# Patient Record
Sex: MALE | Race: WHITE | Employment: FULL TIME | URBAN - METROPOLITAN AREA
[De-identification: names, ages, dates, MRNs, and addresses within clinical notes are randomized per-mention and may not be internally consistent; named-entity substitution may affect disease eponyms.]

---

## 2018-08-03 ENCOUNTER — OFFICE VISIT (OUTPATIENT)
Dept: INTERNAL MEDICINE CLINIC | Age: 58
End: 2018-08-03

## 2018-08-03 VITALS
OXYGEN SATURATION: 97 % | BODY MASS INDEX: 33.37 KG/M2 | HEART RATE: 92 BPM | WEIGHT: 251.8 LBS | DIASTOLIC BLOOD PRESSURE: 70 MMHG | HEIGHT: 73 IN | SYSTOLIC BLOOD PRESSURE: 130 MMHG

## 2018-08-03 DIAGNOSIS — R51.9 ACUTE NONINTRACTABLE HEADACHE, UNSPECIFIED HEADACHE TYPE: Primary | ICD-10-CM

## 2018-08-03 RX ORDER — MELATONIN
DAILY
COMMUNITY

## 2018-08-03 RX ORDER — DIFLUNISAL 500 MG/1
500 TABLET, FILM COATED ORAL 2 TIMES DAILY
Qty: 60 TAB | Refills: 0 | Status: SHIPPED | OUTPATIENT
Start: 2018-08-03 | End: 2019-01-31 | Stop reason: ALTCHOICE

## 2018-08-03 RX ORDER — BISMUTH SUBSALICYLATE 262 MG
1 TABLET,CHEWABLE ORAL DAILY
COMMUNITY

## 2018-08-03 RX ORDER — CYCLOBENZAPRINE HCL 10 MG
10 TABLET ORAL
Qty: 30 TAB | Refills: 0 | Status: SHIPPED | OUTPATIENT
Start: 2018-08-03 | End: 2019-01-31 | Stop reason: ALTCHOICE

## 2018-08-03 NOTE — PATIENT INSTRUCTIONS
Head or Face Pain: Care Instructions Your Care Instructions Common causes of head or face pain are allergies, stress, and injuries. Other causes include tooth problems and sinus infections. Eating certain foods, such as chocolate or cheese, or drinking certain liquids, such as coffee or cola, can cause head pain for some people. If you have mild head pain, you may not need treatment. It is important to watch your symptoms and talk to your doctor if your pain continues or gets worse. Follow-up care is a key part of your treatment and safety. Be sure to make and go to all appointments, and call your doctor if you are having problems. It's also a good idea to know your test results and keep a list of the medicines you take. How can you care for yourself at home? · Take pain medicines exactly as directed. ¨ If the doctor gave you a prescription medicine for pain, take it as prescribed. ¨ If you are not taking a prescription pain medicine, ask your doctor if you can take an over-the-counter pain medicine. · Take it easy for the next few days or longer if you are not feeling well. · Use a warm, moist towel or heating pad set on low to relax tight muscles in your shoulder and neck. Have someone gently massage your neck and shoulders. · Put ice or a cold pack on the area for 10 to 20 minutes at a time. Put a thin cloth between the ice and your skin. When should you call for help? Call 911 anytime you think you may need emergency care. For example, call if: 
  · You have twitching, jerking, or a seizure.  
  · You passed out (lost consciousness).  
  · You have symptoms of a stroke. These may include: 
¨ Sudden numbness, tingling, weakness, or loss of movement in your face, arm, or leg, especially on only one side of your body. ¨ Sudden vision changes. ¨ Sudden trouble speaking. ¨ Sudden confusion or trouble understanding simple statements. ¨ Sudden problems with walking or balance.  
¨ A sudden, severe headache that is different from past headaches.  
  · You have jaw pain and pain in your chest, shoulder, neck, or arm.  
 Call your doctor now or seek immediate medical care if: 
  · You have a fever with a stiff neck or a severe headache.  
  · You have nausea and vomiting, or you cannot keep food or liquids down.  
 Watch closely for changes in your health, and be sure to contact your doctor if: 
  · Your head or face pain does not get better as expected. Where can you learn more? Go to http://earline-adamaris.info/. Enter P568 in the search box to learn more about \"Head or Face Pain: Care Instructions. \" Current as of: November 20, 2017 Content Version: 11.7 © 1162-0989 [employer]. Care instructions adapted under license by [employer] (which disclaims liability or warranty for this information). If you have questions about a medical condition or this instruction, always ask your healthcare professional. [address] any warranty or liability for your use of this information.

## 2018-08-03 NOTE — MR AVS SNAPSHOT
[address] [address] [address] [id number] [phone] Patient: Nani Mccormack MRN: [id number] EFO:[id number] Visit Information Date & Time Provider Department Dept. Phone Encounter #  
 8/3/2018  8:50 AM Nish Tate MD [facility] [id number] Follow-up Instructions Return if symptoms worsen or fail to improve. Upcoming Health Maintenance Date Due DTaP/Tdap/Td series (1 - Tdap) 3/27/1979 FOBT Q 1 YEAR AGE 50-75 3/27/2008 ZOSTER VACCINE AGE 60> 1/27/2018 Influenza Age 5 to Adult 8/1/2018 Allergies as of 8/3/2018  Review Complete On: 8/3/2018 By: Nish Tate MD  
  
 Severity Noted Reaction Type Reactions Ciprofloxacin  08/03/2018    Other (comments) Eye drops Current Immunizations  Never Reviewed No immunizations on file. Not reviewed this visit Vitals BP Pulse Height(growth percentile) Weight(growth percentile) SpO2 BMI  
 130/70 (BP 1 Location: Right arm, BP Patient Position: Sitting) 92 6' 1\" (1.854 m) 251 lb 12.8 oz (114.2 kg) 97% 33.22 kg/m2 Smoking Status Never Smoker BMI and BSA Data Body Mass Index Body Surface Area  
 33.22 kg/m 2 2.43 m 2 Preferred Pharmacy Pharmacy Name Phone [id number], [address] [place] [address] Your Updated Medication List  
  
   
This list is accurate as of 8/3/18  9:11 AM.  Always use your most recent med list.  
  
  
  
  
 cyclobenzaprine 10 mg tablet Commonly known as:  FLEXERIL Take 1 Tab by mouth three (3) times daily as needed for Muscle Spasm(s). diflunisal 500 mg Tab Commonly known as:  DOLOBID Take 1 Tab by mouth two (2) times a day. FISH  mg Cap Generic drug:  Omega-3 Fatty Acids Take  by mouth.  
  
 multivitamin tablet Commonly known as:  ONE A DAY Take 1 Tab by mouth daily. VITAMIN D3 1,000 unit tablet Generic drug:  cholecalciferol Take  by mouth daily. Prescriptions Sent to Pharmacy Refills  
 diflunisal (DOLOBID) 500 mg tab 0 Sig: Take 1 Tab by mouth two (2) times a day. Class: Normal  
 Pharmacy: [facility], [address] [place] [address] Ph #: [phone] Route: Oral  
 cyclobenzaprine (FLEXERIL) 10 mg tablet 0 Sig: Take 1 Tab by mouth three (3) times daily as needed for Muscle Spasm(s). Class: Normal  
 Pharmacy: [facility], [address] [place] [address] Ph #: [phone] Route: Oral  
  
Follow-up Instructions Return if symptoms worsen or fail to improve. Introducing [facility]! Jamee Reagan introduces [employer] patient portal. Now you can access parts of your medical record, email your doctor's office, and request medication refills online. 1. In your internet browser, go to [email or web address] 2. Click on the First Time User? Click Here link in the Sign In box. You will see the New Member Sign Up page. 3. Enter your [employer] Access Code exactly as it appears below. You will not need to use this code after youve completed the sign-up process. If you do not sign up before the expiration date, you must request a new code. · [employer] Access Code: [email or web address] Expires: 11/1/2018  8:36 AM 
 
4. Enter the last four digits of your Social Security Number (xxxx) and Date of Birth (mm/dd/yyyy) as indicated and click Submit. You will be taken to the next sign-up page. 5. Create a [employer] ID. This will be your [employer] login ID and cannot be changed, so think of one that is secure and easy to remember. 6. Create a [employer] password. You can change your password at any time. 7. Enter your Password Reset Question and Answer.  This can be used at a later time if you forget your password. 8. Enter your e-mail address. You will receive e-mail notification when new information is available in [address] 9. Click Sign Up. You can now view and download portions of your medical record. 10. Click the Download Summary menu link to download a portable copy of your medical information. If you have questions, please visit the Frequently Asked Questions section of the [employer] website. Remember, [employer] is NOT to be used for urgent needs. For medical emergencies, dial 911. Now available from your iPhone and Android! Please provide this summary of care documentation to your next provider. Your primary care clinician is listed as MARIA GUADALUPE Julio 21. If you have any questions after today's visit, please call [phone].

## 2018-08-03 NOTE — PROGRESS NOTES
Peter Elkins is a 59 y.o. male presenting for Head Pain Blaise Downey 1. Have you been to the ER, urgent care clinic since your last visit? Hospitalized since your last visit? No 
 
2. Have you seen or consulted any other health care providers outside of the [facility] since your last visit? Include any pap smears or colon screening. No 
 
No flowsheet data found. No flowsheet data found. No flowsheet data found. There are no discontinued medications.

## 2018-08-03 NOTE — PROGRESS NOTES
This note will not be viewable in [address] Subjective:  
 
Mr. Marques Laughlin presents to the office today with complaints of 3 days worth of pain in the right parietal occipital region in his right ear and occasionally in his neck. The pain is not constant but intermittent and sometimes a shooting type of pain. He notes that his head feels sensitive. There is been no rash. He has taken an occasional ibuprofen without relief. He has had no loss of hearing. The patient denies any jaw pain or neck pain. There is been no radicular discomfort. He also notes that he has a synovial cyst in his lumbar region for which she has had previous injections. He is called his specialist to go in for another injection since it is been a year since he had the last one. The specialist will not treat him with any type of medication for his cyst until he is seen. He was told to go see his primary care doctor for medication. Past Medical History:  
Diagnosis Date  Acute headache 8/3/2018  Synovial cyst of lumbar spine Past Surgical History:  
Procedure Laterality Date  HX ORTHOPAEDIC Allergies Allergen Reactions  Ciprofloxacin Other (comments) Eye drops Current Outpatient Prescriptions Medication Sig Dispense Refill  multivitamin (ONE A DAY) tablet Take 1 Tab by mouth daily.  cholecalciferol (VITAMIN D3) 1,000 unit tablet Take  by mouth daily.  Omega-3 Fatty Acids (FISH OIL) 500 mg cap Take  by mouth.  diflunisal (DOLOBID) 500 mg tab Take 1 Tab by mouth two (2) times a day. 60 Tab 0  cyclobenzaprine (FLEXERIL) 10 mg tablet Take 1 Tab by mouth three (3) times daily as needed for Muscle Spasm(s). 30 Tab 0 Social History Social History  Marital status:  Spouse name: N/A  
 Number of children: N/A  
 Years of education: N/A Social History Main Topics  Smoking status: Never Smoker  Smokeless tobacco: Never Used  Alcohol use None  Drug use: None  Sexual activity: Not Asked Other Topics Concern  None Social History Narrative  None No family history on file. Review of Systems: 
GEN: no weight loss, weight gain, fatigue or night sweats. Complains of right scalp sensitivity and intermittent pains in the right hemicranium CV: no PND, orthopnea, or palpitations Resp: no dyspnea on exertion, no cough Abd: no nausea, vomiting or diarrhea EXT: denies edema, claudication Endocrine: no hair loss, excessive thirst or polyuria Neurological ROS: no TIA or stroke symptoms ROS otherwise negative Objective:  
 
Visit Vitals  /70 (BP 1 Location: Right arm, BP Patient Position: Sitting)  Pulse 92  
 Ht 6' 1\" (1.854 m)  Wt 251 lb 12.8 oz (114.2 kg)  SpO2 97%  BMI 33.22 kg/m2 Body mass index is 33.22 kg/(m^2). General:   alert, cooperative and no distress ENT  the right ear canal and TM were completely normal to visualization Eyes: conjunctivae/sclerae clear. PERRL, EOM's intact Mouth:  No oral lesions, no pharyngeal erythema, no exudates. Patient has no pain in the temporomandibular joint regions with palpation or with range of motion Neck: Trachea midline, no thyromegaly, no bruits. There was no pain with palpation along the occipital ridge. Derm:  No rashes seen in the neck, on the face or in the scalp. Neuro: ..alert, oriented x3,speech normal in context and clarity, cranial nerves II-XII intact,motor strength: full proximally and distally,gait: normal 
reflexes: full and symmetric Physical exam otherwise negative Assessment/Plan:  
 
Diagnoses and all orders for this visit: 
 
Acute nonintractable headache, unspecified headache type Other orders 
-     diflunisal (DOLOBID) 500 mg tab; Take 1 Tab by mouth two (2) times a day., Normal, Disp-60 Tab, R-0 
-     cyclobenzaprine (FLEXERIL) 10 mg tablet;  Take 1 Tab by mouth three (3) times daily as needed for Muscle Spasm(s). , Normal, Disp-30 Tab, R-0 Other instructions: The patient presents with scalp sensitivity and intermittent pain in the right hemicranium. There is no localized findings to explain his symptoms. There is no rash to suggest shingles. He has had no focal neurological deficits to suggest any type of intracranial process. Have placed on Dolobid and Flexeril to see if this will alleviate his symptoms. He is to watch for the development of rash or any new neurological symptoms and to contact me immediately if such should develop. He will follow-up in 7-10 days time if not improve Follow-up Disposition: 
Return if symptoms worsen or fail to improve.  
 
Yusuf Matos MD

## 2018-08-27 PROBLEM — H91.91 HEARING LOSS, RIGHT: Status: ACTIVE | Noted: 2018-08-27

## 2018-08-27 PROBLEM — N52.9 ED (ERECTILE DYSFUNCTION): Status: ACTIVE | Noted: 2018-08-27

## 2018-08-27 PROBLEM — K63.5 COLON POLYPS: Status: ACTIVE | Noted: 2018-08-27

## 2018-08-27 PROBLEM — E55.9 VITAMIN D DEFICIENCY: Status: ACTIVE | Noted: 2018-08-27

## 2019-01-31 ENCOUNTER — OFFICE VISIT (OUTPATIENT)
Dept: INTERNAL MEDICINE CLINIC | Age: 59
End: 2019-01-31

## 2019-01-31 VITALS
DIASTOLIC BLOOD PRESSURE: 84 MMHG | HEIGHT: 73 IN | TEMPERATURE: 98.1 F | OXYGEN SATURATION: 98 % | RESPIRATION RATE: 20 BRPM | SYSTOLIC BLOOD PRESSURE: 124 MMHG | WEIGHT: 250 LBS | HEART RATE: 68 BPM | BODY MASS INDEX: 33.13 KG/M2

## 2019-01-31 DIAGNOSIS — L72.3 SEBACEOUS CYST: Primary | ICD-10-CM

## 2019-01-31 RX ORDER — CEPHALEXIN 500 MG/1
500 CAPSULE ORAL 4 TIMES DAILY
Qty: 20 CAP | Refills: 0 | Status: SHIPPED | OUTPATIENT
Start: 2019-01-31 | End: 2021-02-01 | Stop reason: ALTCHOICE

## 2019-01-31 NOTE — PROGRESS NOTES
This note will not be viewable in [address] Subjective:  
 
Mr. Skyla Bowman presents to the office today with complaints of an abscess on his back which has drained. He has noted a small swollen area in this vicinity for 2-3 years. He was doing Pilates and noticed that when he was laying on his back that this area had gotten bigger. Yesterday it drained a fair amount of pus. The patient notes no pain at the present time. He has had no fevers or chills. Past Medical History:  
Diagnosis Date  Acute headache 8/3/2018  Colon polyps 8/27/2018  ED (erectile dysfunction) 8/27/2018  Hearing loss, right 8/27/2018  Synovial cyst of lumbar spine  Vitamin D deficiency 8/27/2018 Past Surgical History:  
Procedure Laterality Date  HX ORTHOPAEDIC Allergies Allergen Reactions  Ciprofloxacin Other (comments) Eye drops Current Outpatient Medications Medication Sig Dispense Refill  amino acids/mv,iron,min (OCUVITE EXTRA PO) Take  by mouth.  cephALEXin (KEFLEX) 500 mg capsule Take 1 Cap by mouth four (4) times daily. 20 Cap 0  
 multivitamin (ONE A DAY) tablet Take 1 Tab by mouth daily.  cholecalciferol (VITAMIN D3) 1,000 unit tablet Take  by mouth daily.  Omega-3 Fatty Acids (FISH OIL) 500 mg cap Take  by mouth. Social History Socioeconomic History  Marital status:  Spouse name: Not on file  Number of children: Not on file  Years of education: Not on file  Highest education level: Not on file Tobacco Use  Smoking status: Never Smoker  Smokeless tobacco: Never Used Substance and Sexual Activity  Alcohol use: Yes Comment: occasionally  Drug use: No  
 
Family History Problem Relation Age of Onset  Cancer Father   
     breast cancer  Emphysema Father Review of Systems: 
GEN: no weight loss, weight gain, fatigue or night sweats CV: no PND, orthopnea, or palpitations Resp: no dyspnea on exertion, no cough Abd: no nausea, vomiting or diarrhea EXT: denies edema, claudication Derm: Complains of abscess on back which has drained Neurological ROS: no TIA or stroke symptoms ROS otherwise negative Objective:  
 
Visit Vitals /84 (BP 1 Location: Right arm, BP Patient Position: Sitting) Pulse 68 Temp 98.1 °F (36.7 °C) (Oral) Resp 20 Ht 6' 1\" (1.854 m) Wt 250 lb (113.4 kg) SpO2 98% BMI 32.98 kg/m² Body mass index is 32.98 kg/m². General:   alert, cooperative and no distress Derm:  There are multiple seborrheic keratoses that are on the back. In the mid back region he has a sebaceous cyst which has spontaneously drained. There is some mild surrounding redness. No sebum could be expressed from the cyst.  
Neuro: ..alert, oriented x3,speech normal in context and clarity, cranial nerves II-XII intact,motor strength: full proximally and distally,gait: normal 
reflexes: full and symmetric Physical exam otherwise negative Assessment/Plan:  
 
Diagnoses and all orders for this visit: 
 
Sebaceous cyst 
-     cephALEXin (KEFLEX) 500 mg capsule; Take 1 Cap by mouth four (4) times daily. , Normal, Disp-20 Cap, R-0 Other instructions: The patient has a sebaceous cyst which has spontaneously drained. We have covered the wound as it currently is not draining and will start on Keflex for 5 days to cover for any infection. I have recommended an appointment with a general surgeon to have this excised as it most likely will recur in the future due to its position in the mid back region. Follow-up if there is any worsening Follow-up Disposition: 
Return if symptoms worsen or fail to improve.  
 
Markus Pedersen MD

## 2019-01-31 NOTE — PATIENT INSTRUCTIONS

## 2019-01-31 NOTE — PROGRESS NOTES
Kate Hodgson is a 59 y.o. male presenting for Cyst (on back/ draining) Naoma Jackccoli 1. Have you been to the ER, urgent care clinic since your last visit? Hospitalized since your last visit? Yes When: 12/2018 Where: [facility] clinic Reason for visit: sinus 2. Have you seen or consulted any other health care providers outside of the [address] [place] [address] since your last visit? Include any pap smears or colon screening. No 
 
No flowsheet data found. Abuse Screening Questionnaire 1/31/2019 Do you ever feel afraid of your partner? Tomi Jay Are you in a relationship with someone who physically or mentally threatens you? Tomi Jay Is it safe for you to go home? Y  
 
 
PHQ over the last two weeks 1/31/2019 Little interest or pleasure in doing things Not at all Feeling down, depressed, irritable, or hopeless Not at all Total Score PHQ 2 0 There are no discontinued medications.

## 2021-01-04 ENCOUNTER — TELEPHONE (OUTPATIENT)
Dept: INTERNAL MEDICINE CLINIC | Age: 61
End: 2021-01-04

## 2021-01-04 NOTE — TELEPHONE ENCOUNTER
Discussed COVID symptoms with patient- he was exposed on 12-18-20 and had symptoms afterward. He was not tested. Temp is now 99.5 and he has fatigue. Scheduled him a physical for 2-1-21.  He would like a COVID antibody test.

## 2021-02-01 ENCOUNTER — OFFICE VISIT (OUTPATIENT)
Dept: INTERNAL MEDICINE CLINIC | Age: 61
End: 2021-02-01
Payer: COMMERCIAL

## 2021-02-01 ENCOUNTER — TELEPHONE (OUTPATIENT)
Dept: INTERNAL MEDICINE CLINIC | Age: 61
End: 2021-02-01

## 2021-02-01 VITALS
SYSTOLIC BLOOD PRESSURE: 124 MMHG | WEIGHT: 209.6 LBS | HEART RATE: 83 BPM | RESPIRATION RATE: 16 BRPM | HEIGHT: 73 IN | BODY MASS INDEX: 27.78 KG/M2 | TEMPERATURE: 98 F | DIASTOLIC BLOOD PRESSURE: 70 MMHG | OXYGEN SATURATION: 95 %

## 2021-02-01 DIAGNOSIS — E55.9 VITAMIN D DEFICIENCY: ICD-10-CM

## 2021-02-01 DIAGNOSIS — Z11.59 NEED FOR HEPATITIS C SCREENING TEST: ICD-10-CM

## 2021-02-01 DIAGNOSIS — Z00.00 ROUTINE PHYSICAL EXAMINATION: Primary | ICD-10-CM

## 2021-02-01 DIAGNOSIS — N52.9 ERECTILE DYSFUNCTION, UNSPECIFIED ERECTILE DYSFUNCTION TYPE: ICD-10-CM

## 2021-02-01 DIAGNOSIS — Z11.3 SCREENING FOR STDS (SEXUALLY TRANSMITTED DISEASES): Primary | ICD-10-CM

## 2021-02-01 DIAGNOSIS — K63.5 POLYP OF COLON, UNSPECIFIED PART OF COLON, UNSPECIFIED TYPE: ICD-10-CM

## 2021-02-01 LAB
25(OH)D3 SERPL-MCNC: 38 NG/ML (ref 30–96)
A-G RATIO,AGRAT: 1.6 RATIO
ALBUMIN SERPL-MCNC: 4.9 G/DL (ref 3.9–5.4)
ALP SERPL-CCNC: 84 U/L (ref 38–126)
ALT SERPL-CCNC: 30 U/L (ref 0–50)
ANION GAP SERPL CALC-SCNC: 13 MMOL/L
AST SERPL W P-5'-P-CCNC: 27 U/L (ref 14–36)
BILIRUB SERPL-MCNC: 0.7 MG/DL (ref 0.2–1.3)
BILIRUB UR QL: NEGATIVE
BUN SERPL-MCNC: 20 MG/DL (ref 9–20)
BUN/CREATININE RATIO,BUCR: 25 RATIO
CALCIUM OXALATE CRYSTALS: ABNORMAL
CALCIUM SERPL-MCNC: 9.9 MG/DL (ref 8.4–10.2)
CHLORIDE SERPL-SCNC: 101 MMOL/L (ref 98–107)
CHOL/HDL RATIO,CHHD: 4 RATIO (ref 0–4)
CHOLEST SERPL-MCNC: 222 MG/DL (ref 0–200)
CLARITY: CLEAR
CO2 SERPL-SCNC: 29 MMOL/L (ref 22–32)
COLOR UR: ABNORMAL
CREAT SERPL-MCNC: 0.8 MG/DL (ref 0.8–1.5)
ERYTHROCYTE [DISTWIDTH] IN BLOOD BY AUTOMATED COUNT: 12.4 %
GLOBULIN,GLOB: 3
GLUCOSE 24H UR-MRATE: NEGATIVE G/(24.H)
GLUCOSE SERPL-MCNC: 108 MG/DL (ref 75–110)
HBA1C MFR BLD HPLC: 5.4 % (ref 4–5.7)
HCT VFR BLD AUTO: 50.2 % (ref 37–51)
HDLC SERPL-MCNC: 61 MG/DL (ref 35–130)
HGB BLD-MCNC: 16.5 G/DL (ref 12–18)
HGB UR QL STRIP: NEGATIVE
KETONES UR QL STRIP.AUTO: ABNORMAL
LDL/HDL RATIO,LDHD: 2 RATIO
LDLC SERPL CALC-MCNC: 127 MG/DL (ref 0–130)
LEUKOCYTE ESTERASE: NEGATIVE
LYMPHOCYTES ABSOLUTE: 2.3 K/UL (ref 0.6–4.1)
LYMPHOCYTES NFR BLD: 31.1 % (ref 10–58.5)
MCH RBC QN AUTO: 30.6 PG (ref 26–32)
MCHC RBC AUTO-ENTMCNC: 32.9 G/DL (ref 30–36)
MCV RBC AUTO: 93.1 FL (ref 80–97)
MONOCYTES ABS-DIF,2141: 0.6 K/UL (ref 0–1.8)
MONOCYTES NFR BLD: 7.9 % (ref 0.1–24)
MUCUS,MUCUS: ABNORMAL
NEUTROPHILS # BLD: 61 % (ref 37–92)
NEUTROPHILS ABS,2156: 4.5 K/UL (ref 2–7.8)
NITRITE UR QL STRIP.AUTO: NEGATIVE
PH UR STRIP: 6 [PH] (ref 5–7)
PLATELET # BLD AUTO: 278 K/UL (ref 140–440)
POTASSIUM SERPL-SCNC: 4.6 MMOL/L (ref 3.6–5)
PROT SERPL-MCNC: 7.9 G/DL (ref 6.3–8.2)
PROT UR STRIP-MCNC: ABNORMAL MG/DL
PSA, TEST22: 0.7 NG/ML (ref 0–4)
RBC # BLD AUTO: 5.39 M/UL (ref 4.2–6.3)
RBC #/AREA URNS HPF: 0 #/HPF
SODIUM SERPL-SCNC: 143 MMOL/L (ref 137–145)
SP GR UR REFRACTOMETRY: 1.02 (ref 1–1.03)
TRIGL SERPL-MCNC: 171 MG/DL (ref 0–200)
TSH SERPL DL<=0.05 MIU/L-ACNC: 1.09 UIU/ML (ref 0.34–5.6)
UROBILINOGEN UR QL STRIP.AUTO: ABNORMAL
VLDLC SERPL CALC-MCNC: 34 MG/DL
WBC # BLD AUTO: 7.4 K/UL (ref 4.1–10.9)
WBC URNS QL MICRO: 0 #/HPF

## 2021-02-01 PROCEDURE — G0103 PSA SCREENING: HCPCS | Performed by: INTERNAL MEDICINE

## 2021-02-01 PROCEDURE — 83036 HEMOGLOBIN GLYCOSYLATED A1C: CPT | Performed by: INTERNAL MEDICINE

## 2021-02-01 PROCEDURE — 80053 COMPREHEN METABOLIC PANEL: CPT | Performed by: INTERNAL MEDICINE

## 2021-02-01 PROCEDURE — 82306 VITAMIN D 25 HYDROXY: CPT | Performed by: INTERNAL MEDICINE

## 2021-02-01 PROCEDURE — 99396 PREV VISIT EST AGE 40-64: CPT | Performed by: INTERNAL MEDICINE

## 2021-02-01 PROCEDURE — 80061 LIPID PANEL: CPT | Performed by: INTERNAL MEDICINE

## 2021-02-01 PROCEDURE — 84443 ASSAY THYROID STIM HORMONE: CPT | Performed by: INTERNAL MEDICINE

## 2021-02-01 PROCEDURE — 81001 URINALYSIS AUTO W/SCOPE: CPT | Performed by: INTERNAL MEDICINE

## 2021-02-01 PROCEDURE — 85025 COMPLETE CBC W/AUTO DIFF WBC: CPT | Performed by: INTERNAL MEDICINE

## 2021-02-01 RX ORDER — SILDENAFIL 100 MG/1
100 TABLET, FILM COATED ORAL AS NEEDED
Qty: 30 TAB | Refills: 0 | Status: SHIPPED | OUTPATIENT
Start: 2021-02-01 | End: 2022-09-07 | Stop reason: SDUPTHER

## 2021-02-01 NOTE — TELEPHONE ENCOUNTER
Spoke to patient and he has scheduled an lab only appointment for tomorrow at 2:20 pm. The orders need to be placed.

## 2021-02-01 NOTE — PROGRESS NOTES
Andres Connor is a 56 y.o. male     Chief Complaint   Patient presents with    Complete Physical       Visit Vitals  /70 (BP 1 Location: Left upper arm, BP Patient Position: Sitting)   Pulse 83   Temp 98 °F (36.7 °C) (Temporal)   Resp 16   Ht 6' 1\" (1.854 m)   Wt 209 lb 9.6 oz (95.1 kg)   SpO2 95%   BMI 27.65 kg/m²       Health Maintenance Due   Topic Date Due    Hepatitis C Screening  1960    DTaP/Tdap/Td series (1 - Tdap) 03/27/1979    Lipid Screen  03/27/1998    Colorectal Cancer Screening Combo  03/27/2008    Flu Vaccine (1) 09/01/2020       1. Have you been to the ER, urgent care clinic since your last visit? Hospitalized since your last visit? No    2. Have you seen or consulted any other health care providers outside of the [address] [place] [address] since your last visit? Include any pap smears or colon screening.  No

## 2021-02-01 NOTE — PATIENT INSTRUCTIONS
DASH Diet: Care Instructions  Your Care Instructions     The DASH diet is an eating plan that can help lower your blood pressure. DASH stands for Dietary Approaches to Stop Hypertension. Hypertension is high blood pressure. The DASH diet focuses on eating foods that are high in calcium, potassium, and magnesium. These nutrients can lower blood pressure. The foods that are highest in these nutrients are fruits, vegetables, low-fat dairy products, nuts, seeds, and legumes. But taking calcium, potassium, and magnesium supplements instead of eating foods that are high in those nutrients does not have the same effect. The DASH diet also includes whole grains, fish, and poultry. The DASH diet is one of several lifestyle changes your doctor may recommend to lower your high blood pressure. Your doctor may also want you to decrease the amount of sodium in your diet. Lowering sodium while following the DASH diet can lower blood pressure even further than just the DASH diet alone. Follow-up care is a key part of your treatment and safety. Be sure to make and go to all appointments, and call your doctor if you are having problems. It's also a good idea to know your test results and keep a list of the medicines you take. How can you care for yourself at home? Following the DASH diet  · Eat 4 to 5 servings of fruit each day. A serving is 1 medium-sized piece of fruit, ½ cup chopped or canned fruit, 1/4 cup dried fruit, or 4 ounces (½ cup) of fruit juice. Choose fruit more often than fruit juice. · Eat 4 to 5 servings of vegetables each day. A serving is 1 cup of lettuce or raw leafy vegetables, ½ cup of chopped or cooked vegetables, or 4 ounces (½ cup) of vegetable juice. Choose vegetables more often than vegetable juice. · Get 2 to 3 servings of low-fat and fat-free dairy each day. A serving is 8 ounces of milk, 1 cup of yogurt, or 1 ½ ounces of cheese. · Eat 6 to 8 servings of grains each day.  A serving is 1 slice of bread, 1 ounce of dry cereal, or ½ cup of cooked rice, pasta, or cooked cereal. Try to choose whole-grain products as much as possible. · Limit lean meat, poultry, and fish to 2 servings each day. A serving is 3 ounces, about the size of a deck of cards. · Eat 4 to 5 servings of nuts, seeds, and legumes (cooked dried beans, lentils, and split peas) each week. A serving is 1/3 cup of nuts, 2 tablespoons of seeds, or ½ cup of cooked beans or peas. · Limit fats and oils to 2 to 3 servings each day. A serving is 1 teaspoon of vegetable oil or 2 tablespoons of salad dressing. · Limit sweets and added sugars to 5 servings or less a week. A serving is 1 tablespoon jelly or jam, ½ cup sorbet, or 1 cup of lemonade. · Eat less than 2,300 milligrams (mg) of sodium a day. If you limit your sodium to 1,500 mg a day, you can lower your blood pressure even more. Tips for success  · Start small. Do not try to make dramatic changes to your diet all at once. You might feel that you are missing out on your favorite foods and then be more likely to not follow the plan. Make small changes, and stick with them. Once those changes become habit, add a few more changes. · Try some of the following:  ? Make it a goal to eat a fruit or vegetable at every meal and at snacks. This will make it easy to get the recommended amount of fruits and vegetables each day. ? Try yogurt topped with fruit and nuts for a snack or healthy dessert. ? Add lettuce, tomato, cucumber, and onion to sandwiches. ? Combine a ready-made pizza crust with low-fat mozzarella cheese and lots of vegetable toppings. Try using tomatoes, squash, spinach, broccoli, carrots, cauliflower, and onions. ? Have a variety of cut-up vegetables with a low-fat dip as an appetizer instead of chips and dip. ? Sprinkle sunflower seeds or chopped almonds over salads. Or try adding chopped walnuts or almonds to cooked vegetables.   ? Try some vegetarian meals using beans and peas. Add garbanzo or kidney beans to salads. Make burritos and tacos with mashed france beans or black beans. Where can you learn more? Go to http://www.peacock.com/  Enter H967 in the search box to learn more about \"DASH Diet: Care Instructions. \"  Current as of: December 16, 2019               Content Version: 12.6  © 5211-4593 [employer]. Care instructions adapted under license by [employer] (which disclaims liability or warranty for this information). If you have questions about a medical condition or this instruction, always ask your healthcare professional. [address] any warranty or liability for your use of this information.

## 2021-02-01 NOTE — TELEPHONE ENCOUNTER
Will need to come back in for this due to the amount of blood/urine that we need. Usually send off STS, HIV, hepatitis panel, herpes 1 and 2. Also send urine for gonorrhea and chlamydia.

## 2021-02-01 NOTE — TELEPHONE ENCOUNTER
Patient forgot to ask you at his appt. Can you add a lab test for std's to his labs today?      [phone]

## 2021-02-01 NOTE — PROGRESS NOTES
Subjective:     Cortney Mccall is a 56 y.o. male presenting for annual comprehensive personal healthcare examination. History of present illness: This patient has multiple medical problems. These include:    Mr. Jolynn Devi is a 78-year-old male who presents to the office today for complete checkup. Patient's medical history relatively benign. He does have a history of colon polyps and is overdue for follow-up colonoscopy which was done a colon and rectal specialist.  There is been no change in his diet. Patient does have BASIL and would like to have a prescription for sildenafil and did use some sildenafil that he got from [facility]). He has been having problems with libido and would like to have testosterone levels obtained as well. He has a history of vitamin D deficiency and supplements this. He is due to have this level checked today. During the course of the pandemic the patient lost weight by using a keto diet. He is currently taking some omega-3's however is concerned about his lipid profile. There is no family history of early coronary death and he has had no exertional chest pains or shortness of breath. He is working out actively. Patient Active Problem List   Diagnosis Code    Acute headache R51.9    Colon polyps K63.5    ED (erectile dysfunction) N52.9    Hearing loss, right H91.91    Vitamin D deficiency E55.9        Past Medical History:   Diagnosis Date    Acute headache 8/3/2018    Colon polyps 8/27/2018    ED (erectile dysfunction) 8/27/2018    Hearing loss, right 8/27/2018    Synovial cyst of lumbar spine     Vitamin D deficiency 8/27/2018     Past Surgical History:   Procedure Laterality Date    HX ORTHOPAEDIC       Allergies   Allergen Reactions    Ciprofloxacin Other (comments)     Eye drops     Current Outpatient Medications   Medication Sig Dispense Refill    sildenafil citrate (VIAGRA) 100 mg tablet Take 1 Tab by mouth as needed for Erectile Dysfunction.  30 Tab 0  amino acids/mv,iron,min (OCUVITE EXTRA PO) Take  by mouth.  multivitamin (ONE A DAY) tablet Take 1 Tab by mouth daily.  cholecalciferol (VITAMIN D3) 1,000 unit tablet Take  by mouth daily.  Omega-3 Fatty Acids (FISH OIL) 500 mg cap Take  by mouth. Social History     Socioeconomic History    Marital status:      Spouse name: Not on file    Number of children: Not on file    Years of education: Not on file    Highest education level: Not on file   Tobacco Use    Smoking status: Never Smoker    Smokeless tobacco: Never Used   Substance and Sexual Activity    Alcohol use: Yes     Comment: occasionally    Drug use: No     Family History   Problem Relation Age of Onset    Cancer Father         breast cancer    Emphysema Father        Health Maintenance   Topic Date Due    Hepatitis C Screening  1960    DTaP/Tdap/Td series (1 - Tdap) 03/27/1979    Lipid Screen  03/27/1998    Colorectal Cancer Screening Combo  03/27/2008    Flu Vaccine (1) 06/30/2021 (Originally 9/1/2020)    Shingrix Vaccine Age 50>  Completed    Pneumococcal 0-64 years  Aged Out       Review of Systems  Constitutional:  He denies fevers, weight loss, sweats, or fatigue. HEENT:  No blurred or double vision, headaches or dizziness. No difficulty with swallowing, taste, speech or smell. Respiratory:  No cough, wheezing or shortness of breath, or sputum production. Cardiac:  Denies chest pain, palpitations, unexplained indigestion, syncope, edema, PND or orthopnea. GI:  No changes in bowel habits, abdominal pain, no bloating, anorexia, nausea, vomiting or heartburn. :  He denies frequency, nocturia, stranguria, dysuria or sexual dysfunction. Extremities:  No joint pain, stiffness or swelling. Skin:  No recent rash or mole changes. Neurological:  No numbness, tingling, burning paresthesias or loss of motor strength. No syncope, dizziness, frequent headaches or memory loss.   ROS otherwise negative       Objective:     Vitals:    02/01/21 1346   BP: 124/70   Pulse: 83   Resp: 16   Temp: 98 °F (36.7 °C)   TempSrc: Temporal   SpO2: 95%   Weight: 209 lb 9.6 oz (95.1 kg)   Height: 6' 1\" (1.854 m)   PainSc:   0 - No pain      Body mass index is 27.65 kg/m². Physical exam:   General Appearance:  Well-developed, well-nourished, no acute distress. Vision:  Deferred to ophthalmologist.    Hearing: HEENT:    Ears:  The TMs and ear canals were clear. Eyes:  The pupillary responses were normal.  Extraocular muscle function intact. Lids and conjunctiva not injected. Funduscopic exam revealed sharp disc margins. Pharynx:  Clear with teeth in good repair. No masses were noted. Neck:  Supple without thyromegaly or adenopathy. No JVD noted. No carotid bruits. Lungs: Clear to auscultation and percussion. Cardiac:  Regular rate and rhythm. No murmur. PMI not displaced. No gallop, rub or click. Abdomen:  Flat, soft, non-tender without palpable organomegaly or mass. No pulsatile mass was felt, and no bruit was heard. Bowel sounds were active. Extremities:  No clubbing, cyanosis or edema. Pulses:  Dorsalis pedis and posterior tibial pulses felt without difficulty. Skin: Multiple seborrheic keratoses are present especially on the back  Lymph Nodes:  None felt in the cervical, supraclavicular, axillary or inguinal region. Neurological:  Cranial nerves II-XII grossly intact. Motor strength 5/5. DTRs 2+ and symmetric.   Station and gait normal.         Assessment/Plan:   Impressions:  Diagnoses and all orders for this visit:    Routine physical examination  -     HEPATITIS C AB  -     COLLECTION VENOUS BLOOD,VENIPUNCTURE  -     CBC WITH AUTOMATED DIFF  -     LIPID PANEL  -     METABOLIC PANEL, COMPREHENSIVE  -     TSH 3RD GENERATION  -     PSA SCREENING (SCREENING)  -     URINALYSIS W/MICROSCOPIC  -     HEMOGLOBIN A1C W/O EAG    Polyp of colon, unspecified part of colon, unspecified type    Vitamin D deficiency    Need for hepatitis C screening test  -     HEPATITIS C AB    Erectile dysfunction, unspecified erectile dysfunction type  -     TESTOSTERONE, FREE & TOTAL  -     sildenafil citrate (VIAGRA) 100 mg tablet; Take 1 Tab by mouth as needed for Erectile Dysfunction. , Normal, Disp-30 Tab, R-0        Other instructions: The patient's medications were reviewed and reconciled. No change in his current medical regimen will be made. Continue keto diet and exercise regimen    Patient will follow up with colon and rectal specialist regarding colonoscopy    Prescription for Viagra obtained. Testosterone levels obtained in regards to his erectile dysfunction. CDC recommended hepatitis C screening will be obtained    Have recommended Covid-19 vaccine once his group is recommended to receive    Follow-up yearly    Follow-up and Dispositions    · Return in about 1 year (around 2/1/2022). Zac Dan MD    Please note that this dictation was completed with [employer], the computer voice recognition software. Quite often unanticipated grammatical, syntax, homophones, and other interpretive errors are inadvertently transcribed by the computer software. Please disregard these errors. Please excuse any errors that have escaped final proofreading.

## 2021-02-02 ENCOUNTER — APPOINTMENT (OUTPATIENT)
Dept: INTERNAL MEDICINE CLINIC | Age: 61
End: 2021-02-02

## 2021-02-02 DIAGNOSIS — Z11.3 SCREENING FOR STDS (SEXUALLY TRANSMITTED DISEASES): ICD-10-CM

## 2021-02-03 LAB
HCV AB S/CO SERPL IA: <0.1 S/CO RATIO (ref 0–0.9)
TESTOST FREE SERPL-MCNC: 6.6 PG/ML (ref 6.6–18.1)
TESTOST SERPL-MCNC: 326 NG/DL (ref 264–916)

## 2021-02-03 NOTE — PROGRESS NOTES
Initial labs stable except for slight increase in LDL to 127 and would recommend continued exercise and dieting.     STD labs pending

## 2021-02-05 LAB
C TRACH RRNA CVX QL NAA+PROBE: NORMAL
C TRACH RRNA UR QL NAA+PROBE: NEGATIVE
HAV IGM SERPL QL IA: NEGATIVE
HBV CORE IGM SERPL QL IA: NEGATIVE
HBV SURFACE AG SERPL QL IA: NEGATIVE
HCV AB S/CO SERPL IA: <0.1 S/CO RATIO (ref 0–0.9)
HIV 1+2 AB+HIV1 P24 AG SERPL QL IA: NON REACTIVE
HSV2 IGG SER IA-ACNC: <0.91 INDEX (ref 0–0.9)
M HOMINIS DNA SPEC QL NAA+PROBE: NEGATIVE
MYCOPLASMA GENITALIUM NAA, 180024: NEGATIVE
N GONORRHOEA RRNA CVX QL NAA+PROBE: NORMAL
N GONORRHOEA RRNA UR QL NAA+PROBE: NEGATIVE
RPR SER QL: NON REACTIVE
UREAPLASMA DNA SPEC QL NAA+PROBE: POSITIVE

## 2021-02-06 NOTE — PROGRESS NOTES
STD check negative except for colonization with ureaplasma. Recommend treatment with doxycycline 100 mg BID with meals #14. Partner should consider treatment as well.

## 2021-02-08 ENCOUNTER — TELEPHONE (OUTPATIENT)
Dept: INTERNAL MEDICINE CLINIC | Age: 61
End: 2021-02-08

## 2021-02-08 RX ORDER — DOXYCYCLINE 100 MG/1
100 CAPSULE ORAL 2 TIMES DAILY
Qty: 14 CAP | Refills: 0 | Status: SHIPPED | OUTPATIENT
Start: 2021-02-08 | End: 2022-05-04 | Stop reason: SDUPTHER

## 2021-02-08 NOTE — TELEPHONE ENCOUNTER
Patient advised of lab results- please send pended Rx to [facility]  Requested Prescriptions     Pending Prescriptions Disp Refills    doxycycline (VIBRAMYCIN) 100 mg capsule 14 Cap 0     Sig: Take 1 Cap by mouth two (2) times a day.

## 2021-02-08 NOTE — TELEPHONE ENCOUNTER
----- Message from Melonie Urrutia MD sent at 2/6/2021  8:18 AM EST -----  STD check negative except for colonization with ureaplasma. Recommend treatment with doxycycline 100 mg BID with meals #14. Partner should consider treatment as well.

## 2021-03-03 PROBLEM — Z11.3 SCREENING FOR STDS (SEXUALLY TRANSMITTED DISEASES): Status: RESOLVED | Noted: 2021-02-01 | Resolved: 2021-03-03

## 2022-03-19 PROBLEM — H91.91 HEARING LOSS, RIGHT: Status: ACTIVE | Noted: 2018-08-27

## 2022-03-19 PROBLEM — E55.9 VITAMIN D DEFICIENCY: Status: ACTIVE | Noted: 2018-08-27

## 2022-03-19 PROBLEM — N52.9 ED (ERECTILE DYSFUNCTION): Status: ACTIVE | Noted: 2018-08-27

## 2022-03-19 PROBLEM — K63.5 COLON POLYPS: Status: ACTIVE | Noted: 2018-08-27

## 2022-03-19 PROBLEM — R51.9 ACUTE HEADACHE: Status: ACTIVE | Noted: 2018-08-03

## 2022-05-04 ENCOUNTER — OFFICE VISIT (OUTPATIENT)
Dept: INTERNAL MEDICINE CLINIC | Age: 62
End: 2022-05-04
Payer: COMMERCIAL

## 2022-05-04 VITALS
TEMPERATURE: 98 F | OXYGEN SATURATION: 97 % | BODY MASS INDEX: 29.24 KG/M2 | HEIGHT: 73 IN | RESPIRATION RATE: 20 BRPM | SYSTOLIC BLOOD PRESSURE: 122 MMHG | DIASTOLIC BLOOD PRESSURE: 72 MMHG | WEIGHT: 220.6 LBS | HEART RATE: 78 BPM

## 2022-05-04 DIAGNOSIS — S20.462A TICK BITE OF LEFT BACK WALL OF THORAX, INITIAL ENCOUNTER: Primary | ICD-10-CM
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
## 2022-05-04 PROCEDURE — 99213 OFFICE O/P EST LOW 20 MIN: CPT | Performed by: INTERNAL MEDICINE

## 2022-05-04 RX ORDER — DOXYCYCLINE 100 MG/1
100 CAPSULE ORAL 2 TIMES DAILY
Qty: 20 CAPSULE | Refills: 0 | Status: SHIPPED | OUTPATIENT
Start: 2022-05-04

## 2022-05-04 NOTE — PROGRESS NOTES
Fanny Mcknight is a 57 y.o. male presenting for Tick Removal (Back)  . 1. Have you been to the ER, urgent care clinic since your last visit? Hospitalized since your last visit? No    2. Have you seen or consulted any other health care providers outside of the [address] [place] [address] since your last visit? Include any pap smears or colon screening. Dermatologist    Fall Risk Assessment, last 12 mths 2/1/2021   Able to walk? Yes   Fall in past 12 months? 0   Do you feel unsteady? 0   Are you worried about falling 0         Abuse Screening Questionnaire 2/1/2021   Do you ever feel afraid of your partner? N   Are you in a relationship with someone who physically or mentally threatens you? N   Is it safe for you to go home? Y       3 most recent PHQ Screens 2/1/2021   Little interest or pleasure in doing things Not at all   Feeling down, depressed, irritable, or hopeless Not at all   Total Score PHQ 2 0       There are no discontinued medications.

## 2022-05-04 NOTE — PROGRESS NOTES
Isaiah Vidal is a 57 y.o. male and presents with Tick Removal (Back)  . Subjective:  Mr. Elizabeth Grant presents today with complaint of a tick bite on his left upper torso and the back. He went camping last week and noticed this yesterday. He has a raised tender area and he is concerned that he has a tick bite there. His girlfriend found a couple of ticks after camping as well. He has had no fever or arthralgias. Past Medical History:   Diagnosis Date    Acute headache 8/3/2018    Colon polyps 8/27/2018    ED (erectile dysfunction) 8/27/2018    Hearing loss, right 8/27/2018    Synovial cyst of lumbar spine     Vitamin D deficiency 8/27/2018     Past Surgical History:   Procedure Laterality Date    HX ORTHOPAEDIC       Allergies   Allergen Reactions    Ciprofloxacin Other (comments)     Eye drops     Current Outpatient Medications   Medication Sig Dispense Refill    doxycycline (VIBRAMYCIN) 100 mg capsule Take 1 Capsule by mouth two (2) times a day. 20 Capsule 0    sildenafil citrate (VIAGRA) 100 mg tablet Take 1 Tab by mouth as needed for Erectile Dysfunction. 30 Tab 0    amino acids/mv,iron,min (OCUVITE EXTRA PO) Take  by mouth.  multivitamin (ONE A DAY) tablet Take 1 Tab by mouth daily.  cholecalciferol (VITAMIN D3) 1,000 unit tablet Take  by mouth daily.  Omega-3 Fatty Acids (FISH OIL) 500 mg cap Take  by mouth.        Social History     Socioeconomic History    Marital status:    Tobacco Use    Smoking status: Never Smoker    Smokeless tobacco: Never Used   Substance and Sexual Activity    Alcohol use: Yes     Comment: occasionally    Drug use: No     Family History   Problem Relation Age of Onset    Cancer Father         breast cancer    Emphysema Father        Review of Systems  Constitutional:  negative for fevers, chills, anorexia and weight loss  Eyes:    negative for visual disturbance and irritation  ENT:    negative for tinnitus,sore throat,nasal congestion,ear pains. hoarseness  Respiratory:     negative for cough, hemoptysis, dyspnea,wheezing  CV:    negative for chest pain, palpitations, lower extremity edema  GI:    negative for nausea, vomiting, diarrhea, abdominal pain,melena  Endo:               negative for polyuria,polydipsia,polyphagia,heat intolerance  Genitourinary : negative for frequency, dysuria and hematuria  Integumentary: negative for rash and pruritus  Hematologic:   negative for easy bruising and gum/nose bleeding  Musculoskel:  negative for myalgias, arthralgias, back pain, muscle weakness, joint pain  Neurological:   negative for headaches, dizziness, vertigo, memory problems and gait   Behavl/Psych:  negative for feelings of anxiety, depression, mood changes  ROS otherwise negative      Objective:  Visit Vitals  /72 (BP 1 Location: Right upper arm, BP Patient Position: Sitting, BP Cuff Size: Adult)   Pulse 78   Temp 98 °F (36.7 °C) (Oral)   Resp 20   Ht 6' 1\" (1.854 m)   Wt 220 lb 9.6 oz (100.1 kg)   SpO2 97%   BMI 29.10 kg/m²     Physical Exam:   General appearance - alert, well appearing, and in no distress  Mental status - alert, oriented to person, place, and time  EYE-POLA, EOMI, fundi normal, corneas normal, no foreign bodies  ENT-ENT exam normal, no neck nodes or sinus tenderness  Nose - normal and patent, no erythema, discharge or polyps  Mouth - mucous membranes moist, pharynx normal without lesions  Neck - supple, no significant adenopathy   Chest - clear to auscultation, no wheezes, rales or rhonchi, symmetric air entry   Heart - normal rate, regular rhythm, normal S1, S2, no murmurs, rubs, clicks or gallops   Abdomen - soft, nontender, nondistended, no masses or organomegaly  Lymph- no adenopathy palpable  Ext-peripheral pulses normal, no pedal edema, no clubbing or cyanosis  Skin-raised erythematous lesion measuring approximately 1 cm x 2 cm with mild erythema and calor, no target lesion and no residual tick found  Neuro -alert, oriented, normal speech, no focal findings or movement disorder noted      Assessment/Plan:  Diagnoses and all orders for this visit:    1. Tick bite of left back wall of thorax, initial encounter  -     doxycycline (VIBRAMYCIN) 100 mg capsule; Take 1 Capsule by mouth two (2) times a day. ICD-10-CM ICD-9-CM    1. Tick bite of left back wall of thorax, initial encounter  S20.462A 911.4 doxycycline (VIBRAMYCIN) 100 mg capsule    W57. [facility] E906.4        Plan:    Cover empirically with doxycycline 100 mg twice daily for 10 days. (He notes that insurance would require prior authorization. I have advised him to use good Rx and he can get his prescription for $6.29 at [facility].)  The patient will follow-up if he develops any untoward symptoms or problems. I have reviewed with the patient details of the assessment and plan and all questions were answered. Relevent patient education was performed. Verbal and/or written instructions (see AVS) provided. The most recent lab findings were reviewed with the patient. Plan was discussed with patient who verbally expressed understanding. An After Visit Summary was printed and given to the patient.     Divina Watson MD

## 2022-05-18 ENCOUNTER — HOSPITAL ENCOUNTER (EMERGENCY)
Age: 62
Discharge: HOME | End: 2022-05-18
Attending: STUDENT IN AN ORGANIZED HEALTH CARE EDUCATION/TRAINING PROGRAM
Payer: COMMERCIAL

## 2022-05-18 VITALS
HEART RATE: 79 BPM | WEIGHT: 212 LBS | TEMPERATURE: 98.1 F | RESPIRATION RATE: 16 BRPM | BODY MASS INDEX: 28.1 KG/M2 | DIASTOLIC BLOOD PRESSURE: 95 MMHG | SYSTOLIC BLOOD PRESSURE: 143 MMHG | HEIGHT: 73 IN | OXYGEN SATURATION: 96 %

## 2022-05-18 DIAGNOSIS — Z92.29 TETANUS TOXOID VACCINATION ADMINISTERED GREATER THAN 10 YEARS AGO: ICD-10-CM

## 2022-05-18 DIAGNOSIS — S01.511A LIP LACERATION, INITIAL ENCOUNTER: Primary | ICD-10-CM

## 2022-05-18 PROCEDURE — 74011000250 HC RX REV CODE- 250: Performed by: NURSE PRACTITIONER

## 2022-05-18 PROCEDURE — 90715 TDAP VACCINE 7 YRS/> IM: CPT | Performed by: NURSE PRACTITIONER

## 2022-05-18 PROCEDURE — 99284 EMERGENCY DEPT VISIT MOD MDM: CPT

## 2022-05-18 PROCEDURE — 74011250636 HC RX REV CODE- 250/636: Performed by: NURSE PRACTITIONER

## 2022-05-18 PROCEDURE — 90471 IMMUNIZATION ADMIN: CPT

## 2022-05-18 PROCEDURE — 75810000293 HC SIMP/SUPERF WND  RPR

## 2022-05-18 RX ORDER — BACITRACIN 500 UNIT/G
1 PACKET (EA) TOPICAL
Status: COMPLETED | OUTPATIENT
Start: 2022-05-18 | End: 2022-05-18

## 2022-05-18 RX ORDER — BACITRACIN 500 [USP'U]/G
OINTMENT TOPICAL 3 TIMES DAILY
Qty: 1 EACH | Refills: 0 | Status: SHIPPED | OUTPATIENT
Start: 2022-05-18 | End: 2022-05-28

## 2022-05-18 RX ADMIN — TETANUS TOXOID, REDUCED DIPHTHERIA TOXOID AND ACELLULAR PERTUSSIS VACCINE, ADSORBED 0.5 ML: 5; 2.5; 8; 8; 2.5 SUSPENSION INTRAMUSCULAR at 20:44

## 2022-05-18 RX ADMIN — Medication 1 PACKET: at 20:47

## 2022-05-18 RX ADMIN — Medication 4 ML: at 20:46

## 2022-05-19 NOTE — ED PROVIDER NOTES
56 y/o male with PMHx headache, colon polyps, ED presents ambulatory with c/o lip laceration to the left upper lip. Patient states that he was playing pickle ball this evening and hit himself accidentally in the mouth, states that his teeth hurt. Bleeding controlled, patient denies blood thinner use. Bleeding controlled at this time. Patient denies fall, hitting his head, LOC, abdominal pain, N/V. Patient unsure of last Tetanus vaccine, states that it has been over 10 years. Patient denies tobacco or illicit drug use, occasional alcohol use. Past Medical History:   Diagnosis Date    Acute headache 8/3/2018    Colon polyps 8/27/2018    ED (erectile dysfunction) 8/27/2018    Hearing loss, right 8/27/2018    Synovial cyst of lumbar spine     Vitamin D deficiency 8/27/2018       Past Surgical History:   Procedure Laterality Date    HX ORTHOPAEDIC           Family History:   Problem Relation Age of Onset    Cancer Father         breast cancer    Emphysema Father        Social History     Socioeconomic History    Marital status:      Spouse name: Not on file    Number of children: Not on file    Years of education: Not on file    Highest education level: Not on file   Occupational History    Not on file   Tobacco Use    Smoking status: Never Smoker    Smokeless tobacco: Never Used   Substance and Sexual Activity    Alcohol use: Yes     Comment: occasionally    Drug use: No    Sexual activity: Not on file   Other Topics Concern    Not on file   Social History Narrative    Not on file     Social Determinants of Health     Financial Resource Strain:     Difficulty of Paying Living Expenses: Not on file   Food Insecurity:     Worried About Running Out of Food in the Last Year: Not on file    Donna of Food in the Last Year: Not on file   Transportation Needs:     Lack of Transportation (Medical): Not on file    Lack of Transportation (Non-Medical):  Not on file   Physical Activity:     Days of Exercise per Week: Not on file    Minutes of Exercise per Session: Not on file   Stress:     Feeling of Stress : Not on file   Social Connections:     Frequency of Communication with Friends and Family: Not on file    Frequency of Social Gatherings with Friends and Family: Not on file    Attends Latter day Services: Not on file    Active Member of [address] [place] [address] or Organizations: Not on file    Attends Club or Organization Meetings: Not on file    Marital Status: Not on file   Intimate Partner Violence:     Fear of Current or Ex-Partner: Not on file    Emotionally Abused: Not on file    Physically Abused: Not on file    Sexually Abused: Not on file   Housing Stability:     Unable to Pay for Housing in the Last Year: Not on file    Number of [place] in the Last Year: Not on file    Unstable Housing in the Last Year: Not on file         ALLERGIES: Ciprofloxacin    Review of Systems   Constitutional: Negative for activity change, chills and fever. HENT: Negative. Eyes: Negative for visual disturbance. Respiratory: Negative. Gastrointestinal: Negative for abdominal pain, nausea and vomiting. Musculoskeletal: Negative for myalgias. Skin: Positive for wound. Laceration to the left upper lip. Neurological: Negative for dizziness, weakness and light-headedness. Psychiatric/Behavioral: Negative. Vitals:    05/18/22 2018 05/18/22 2151 05/18/22 2152   BP: (!) 143/102 (!) 143/95    Pulse: 79     Resp: 16     Temp: 98.1 °F (36.7 °C)     SpO2: 99%  96%   Weight: 96.2 kg (212 lb)     Height: 6' 1\" (1.854 m)              Physical Exam  Vitals and nursing note reviewed. Constitutional:       General: He is not in acute distress. Appearance: Normal appearance. He is not ill-appearing. HENT:      Mouth/Throat:      Lips: Pink. Mouth: Mucous membranes are moist. Lacerations present. Dentition: Dental tenderness present. Pharynx: Oropharynx is clear. Comments: Tenderness to palpation, upon assessment, no movement appreciated. Laceration through  knob border to left upper lip  Eyes:      Pupils: Pupils are equal, round, and reactive to light. Cardiovascular:      Rate and Rhythm: Normal rate. Pulmonary:      Effort: Pulmonary effort is normal.   Abdominal:      General: Abdomen is flat. Musculoskeletal:         General: Normal range of motion. Cervical back: Normal range of motion. Skin:     General: Skin is warm and dry. Capillary Refill: Capillary refill takes less than 2 seconds. Neurological:      Mental Status: He is alert and oriented to person, place, and time. Psychiatric:         Mood and Affect: Mood normal.         Behavior: Behavior normal.          MDM  Number of Diagnoses or Management Options  Lip laceration, initial encounter  Tetanus toxoid vaccination administered greater than 10 years ago  Diagnosis management comments: Differential DX: lip laceration       Amount and/or Complexity of Data Reviewed  Discuss the patient with other providers: yes (Case discussed with Dr. Elaina Barahona. )    Risk of Complications, Morbidity, and/or Mortality  Presenting problems: low  Diagnostic procedures: low  Management options: low    Patient Progress  Patient progress: improved         Wound Repair    Date/Time: 5/18/2022 9:52 PM  Performed by: [address] [place] [address] [address] provider: Dr. Pérez Query  Preparation: sterile field established  Pre-procedure re-eval: Immediately prior to the procedure, the patient was reevaluated and found suitable for the planned procedure and any planned medications. Time out: Immediately prior to the procedure a time out was called to verify the correct patient, procedure, equipment, staff and marking as appropriate. .  Location details: lip  Wound length:2.5 cm or less    Anesthesia:  Local Anesthetic: LET (lido, epi, tetracaine)  Anesthetic total: 4 mL  Foreign bodies: no foreign bodies  Irrigation solution: saline  Irrigation method: syringe  Debridement: minimal  Skin closure: 6-0 nylon (4 interupted, 6-0 nylon sutures to the outer lip. )  Number of sutures: 7 (7 total, 4- 6.0 nylon and 3-simple interupted 5-0 coated vicryl absorbable)  Technique: simple and interrupted  Approximation: close  Lip approximation: vermillion border well aligned  Dressing: antibiotic ointment (bacitracin and band aid)  Patient tolerance: patient tolerated the procedure well with no immediate complications  My total time at bedside, performing this procedure was 16-30 minutes. Comments: 4, 6-0 nylon non-absorbable simple interrupted sutures and 3 absorbable coated 5-0 simple interrupted sutures. VITAL SIGNS:  Patient Vitals for the past 4 hrs:   Temp Pulse Resp BP SpO2   05/18/22 2152 -- -- -- -- 96 %   05/18/22 2151 -- -- -- (!) 143/95 --   05/18/22 2018 98.1 °F (36.7 °C) 79 16 (!) 143/102 99 %         LABS:  No results found for this or any previous visit (from the past 6 hour(s)). IMAGING:  No orders to display         Medications During Visit:  Medications   diph,Pertuss(AC),Tet Vac-PF (BOOSTRIX) suspension 0.5 mL (0.5 mL IntraMUSCular Given 5/18/22 2044)   bacitracin 500 unit/gram packet 1 Packet (1 Packet Topical Given 5/18/22 2047)   lidocaine/EPINEPHrine/tetracaine/methylcellulose (LET) topical gel gel 4 mL (4 mL Mouth/Throat Given 5/18/22 2046)         DECISION MAKING:  Aliyah Kelley is a 57 y.o. male who comes in as above. Well-appearing 78-year-old male presents ambulatory with complaints of laceration to the left upper lip. Patient was playing pickle ball and accidentally hit himself with the paddle. Bleeding controlled at this time patient does not want any blood thinners. Patient states that his last tetanus vaccine was greater than 10 years ago, discussed updating tetanus patient agreeable.   Patient initially was concerned about possible broken teeth, upon examination tender but no looseness to the teeth appreciated, teeth intact to the upper mouth. No x-rays required at this time. We will apply let to the left upper laceration on the lip, laceration does cross the vermilion border. Case discussed with Dr. Misti Orantes. 2145-2 cm laceration to the left upper lip.  4 nonabsorbable sutures, 3 absorbable sutures. Vermilion border well approximated. See procedure note for full details. Patient advised to return to the ER in 5 days for a nonabsorbable suture removal, or PCP or patient first.  Patient will be provided with a suture removal kit. Discussed wound care in detail, Rx for bacitracin sent to pharmacy. Advised to return to the ED or PCP for any signs of infection to include redness, drainage, fever or chills. Education provided to not use straws until healed. IMPRESSION:  1. Lip laceration, initial encounter    2. Tetanus toxoid vaccination administered greater than 10 years ago        DISPOSITION:  Discharged home      Current Discharge Medication List      START taking these medications    Details   bacitracin (BACITRACIN) 500 unit/gram oint Apply  to affected area three (3) times daily for 10 days. Apply to affected area up to 3 times a day. Qty: 1 Each, Refills: 0  Start date: 5/18/2022, End date: 5/28/2022              Follow-up Information     Follow up With Specialties Details Why Contact Info    Virginia Lee MD Internal Medicine Physician Schedule an appointment as soon as possible for a visit  As needed Efe  [phone]      [facility] [address] [facility] Emergency Medicine  For suture removal- Lip 5 days, Tuesday morning. [address] [place]  [phone]            The patient is asked to follow-up with their primary care provider in the next several days. They are to call tomorrow for an appointment. The patient is asked to return promptly for any increased concerns or worsening of symptoms.   They can return to this emergency department or any other emergency department.     Pratima Gabriel, OSBALDO  10:01 PM

## 2022-05-19 NOTE — DISCHARGE INSTRUCTIONS
Facial wounds closed with sutures should be covered with a topical antibiotic ointment (eg, bacitracin zinc). They may also be dressed with a nonadhesive dressing for 24 to 48 hours. After 24 to 48 hours, all wounds closed with nonabsorbable sutures can be left open to the air and cleansed gently with soap and water. Your absorbable sutures will dissolve on their own within 1 to 2 weeks. Wash the laceration gently with mild soap and water, pat dry. You may apply the bacitracin up to 3 times a day. Once healed, you may apply Calendula cream, made by [employer]. This will help with the scarring, also apply sun block over the area once completely healed. Any increased redness, drainage, fever, chills, or signs of infection go to your PCP or ER immediately.

## 2022-05-19 NOTE — ED NOTES
Pratima Mancuso Fearing at bedside and gave pt f/u instructions for sutures removal.  Pt had questions concerning cost for F/U. Discharge note: The patient was discharged home in stable condition. The patient is alert and oriented, is in no respiratory distress. The patient's diagnosis, condition and treatment were explained to patient by NP. The patient and family party expressed understanding of discharge instructions and plan of care. A discharge plan has been developed. A  was not involved in the process. Patient offered a wheelchair to ED lobby for discharge but declined at this time. Patient ambulatory to ED lobby to go home.

## 2022-05-20 PROBLEM — Z87.898 HISTORY OF HEADACHE: Status: ACTIVE | Noted: 2018-08-03

## 2022-05-20 NOTE — PROGRESS NOTES
Subjective:     Chief Complaint   Patient presents with    Suture Removal       Waldo Schroeder is a 57 y.o. M. His previous primary care provider was Dr. Leon Toribio, with whom his last visit was in February 2021 for a routine checkup. He does not have much medical history. At the time of his visit in February, he was generally doing well, with an unremarkable physical examination. He was given Viagra for erectile dysfunction. More recently, the patient was seen for a tick bite on his left upper torso after going camping. He was without any systemic symptoms. Given the possibility of Lyme disease, he was given prophylactic doxycycline. Most recently, he was seen earlier this month in the emergency room after hitting himself in the upper lip with a pickleball racquet. A lip laceration was noted and this was treated with sutures. Today, the patient comes in to have his sutures removed and for establishment. He reports feeling generally fine overall today has no significant acute somatic complaints. He is not currently using any medications except for topical treatment for toenail fungus. He says that his lip has been feeling much better ever since getting the sutures placed, and he feels that his lip is well-healed. He knows that some of the sutures were meant to be dissolvable but some were not. He denies any pain with chewing. He does complain of some neck pain, and wonders if this could be secondary to a blockage in his carotid artery. The patient has no personal history of smoking, or any personal history of heart disease or vascular disease. He notes that the pain in his neck typically occurs when holding his head in certain positions. He tends to watch television holding his head tilted downward for prolonged periods of time. He otherwise does not check his blood pressure readings at home. He denies chest pain, shortness of breath, or any further cardiopulmonary symptoms.   His review of systems is otherwise negative. Physical examination is unremarkable. The lip is well-healed, with some remaining sutures on the well-healed laceration. Palpation of the left sternocleidomastoid reveals muscle spasm. There is no evidence of carotid bruit by auscultation. Routine Healthcare Maintenance issues are reviewed and discussed with the patient as noted below. Orders to update gaps in healthcare maintenance were placed as noted below in the Assessment and Plan, where applicable. Past Medical History:  Past Medical History:   Diagnosis Date    Colon polyps 8/27/2018    ED (erectile dysfunction) 8/27/2018    Hearing loss, right 8/27/2018    History of headache 08/03/2018    Synovial cyst of lumbar spine     Vitamin D deficiency 8/27/2018       Past Surgical Histor:  Past Surgical History:   Procedure Laterality Date    HX ORTHOPAEDIC         Allergies: Allergies   Allergen Reactions    Ciprofloxacin Other (comments)     Eye drops       Medications:  Current Outpatient Medications   Medication Sig Dispense Refill    efinaconazole (Jublia) nithya topical solution Apply to both big toenails daily      bacitracin (BACITRACIN) 500 unit/gram oint Apply  to affected area three (3) times daily for 10 days. Apply to affected area up to 3 times a day. 1 Each 0    doxycycline (VIBRAMYCIN) 100 mg capsule Take 1 Capsule by mouth two (2) times a day. 20 Capsule 0    sildenafil citrate (VIAGRA) 100 mg tablet Take 1 Tab by mouth as needed for Erectile Dysfunction. 30 Tab 0    amino acids/mv,iron,min (OCUVITE EXTRA PO) Take  by mouth.  multivitamin (ONE A DAY) tablet Take 1 Tab by mouth daily.  cholecalciferol (VITAMIN D3) 1,000 unit tablet Take  by mouth daily.  Omega-3 Fatty Acids (FISH OIL) 500 mg cap Take  by mouth.          Social History:  Social History     Socioeconomic History    Marital status:    Tobacco Use    Smoking status: Never Smoker    Smokeless tobacco: Never Used   Substance and Sexual Activity    Alcohol use: Yes     Comment: occasionally    Drug use: No       Family History:  Family History   Problem Relation Age of Onset    Cancer Father         breast cancer    Emphysema Father        Immunizations:  Immunization History   Administered Date(s) Administered    COVID-19, Pfizer Purple top, DILUTE for use, 12+ yrs, 30mcg/0.3mL dose 11/21/2021    Influenza Vaccine 10/15/2018    Influenza Vaccine (Quad) PF (>6 Mo Flulaval, Fluarix, and >3 Yrs Afluria, Fluzone [id number]) 11/21/2021    Tdap 05/18/2022    Zoster Recombinant 07/06/2019, 05/14/2020        Healthcare Maintenance:  Health Maintenance   Topic Date Due    Colorectal Cancer Screening Combo  Never done    COVID-19 Vaccine (2 - Pfizer 3-dose series) 12/12/2021    Depression Screen  02/01/2022    Lipid Screen  02/01/2026    DTaP/Tdap/Td series (2 - Td or Tdap) 05/18/2032    Hepatitis C Screening  Completed    Shingrix Vaccine Age 50>  Completed    Flu Vaccine  Completed    Pneumococcal 0-64 years  Aged Out        Review of Systems:  ROS:  Review of Systems   Constitutional: Negative. HENT: Negative. Eyes: Negative. Respiratory: Negative. Cardiovascular: Negative. Gastrointestinal: Negative. Genitourinary: Negative. Musculoskeletal: Negative. Skin: Negative. Neurological: Negative. Endo/Heme/Allergies: Negative. Psychiatric/Behavioral: Negative. ROS otherwise negative      Objective:     Vital Signs:  Visit Vitals  /82   Pulse 69   Temp 98.1 °F (36.7 °C) (Oral)   Resp 20   Ht 6' 1\" (1.854 m)   Wt 218 lb 9.6 oz (99.2 kg)   SpO2 96%   BMI 28.84 kg/m²       BMI:  Body mass index is 28.84 kg/m². Physical Examination:  Physical Exam  Vitals reviewed. Constitutional:       Appearance: Normal appearance. HENT:      Head: Normocephalic and atraumatic.       Nose: Nose normal.      Mouth/Throat:      Mouth: Mucous membranes are moist.   Eyes:      Extraocular Movements: Extraocular movements intact. Conjunctiva/sclera: Conjunctivae normal.      Pupils: Pupils are equal, round, and reactive to light. Cardiovascular:      Rate and Rhythm: Normal rate and regular rhythm. Pulses: Normal pulses. Heart sounds: Normal heart sounds. No murmur heard. No friction rub. No gallop. Pulmonary:      Effort: Pulmonary effort is normal. No respiratory distress. Breath sounds: Normal breath sounds. No wheezing, rhonchi or rales. Abdominal:      General: Bowel sounds are normal. There is no distension. Palpations: Abdomen is soft. There is no mass. Tenderness: There is no abdominal tenderness. There is no guarding or rebound. Musculoskeletal:         General: No tenderness, deformity or signs of injury. Normal range of motion. Cervical back: Normal range of motion and neck supple. Right lower leg: No edema. Left lower leg: No edema. Skin:     General: Skin is warm and dry. Findings: No bruising, lesion or rash. Neurological:      General: No focal deficit present. Mental Status: He is alert and oriented to person, place, and time. Mental status is at baseline. Cranial Nerves: No cranial nerve deficit. Sensory: No sensory deficit. Motor: No weakness. Coordination: Coordination normal.      Gait: Gait normal.      Deep Tendon Reflexes: Reflexes normal.   Psychiatric:         Mood and Affect: Mood normal.         Behavior: Behavior normal.          Physical exam otherwise negative    Diagnostic Testing:    Laboratory Studies:  No visits with results within 1 Year(s) from this visit. Latest known visit with results is:   Appointment on 02/02/2021   Component Date Value Ref Range Status    Chlamydia Prequot, JORGE 02/02/2021 CANCELED   Final-Edited    Result canceled by the ancillary.     Gonococcus Prequot, JORGE 02/02/2021 CANCELED   Final-Edited    Comment: Test not performed  The specimen received for Ct/Ng testing was removed from the Cytyc  vial by the ordering institution. Specific procedures outlined in  our Directory of Services and in the ThinPrep 2000 or ThinPrep 3000  's Manual available from [employer] must be followed to  appropriately remove the desired aliquot volume to avoid cross-  specimen contamination. Result canceled by the ancillary.  HSV 2 Ab IgG, type spec. 02/02/2021 <0.91  0.00 - 0.90 index Final    Comment:                                  Negative        <0.91                                   Equivocal 0.91 - 1.09                                   Positive        >1.09   Note: Negative indicates no antibodies detected to   HSV-2. Equivocal may suggest early infection. If   clinically appropriate, retest at later date. Positive   indicates antibodies detected to HSV-2.      Hepatitis A Ab, IgM 02/02/2021 Negative  Negative Final    Hep B surface Ag screen 02/02/2021 Negative  Negative Final    Hep B Core Ab, IgM 02/02/2021 Negative  Negative Final    Hep C Virus Ab 02/02/2021 <0.1  0.0 - 0.9 s/co ratio Final    Comment:                                   Negative:     < 0.8                               Indeterminate: 0.8 - 0.9                                    Positive:     > 0.9   The CDC recommends that a positive HCV antibody result   be followed up with a HCV Nucleic Acid Amplification   test (069041).  HIV SCREEN 4TH GENERATION WRFX 02/02/2021 Non Reactive  Non Reactive Final    RPR 02/02/2021 Non Reactive  Non Reactive Final    M. genitalium by JORGE 02/02/2021 Negative  Negative Final    MYCOPLASMA HOMINIS JORGE 02/02/2021 Negative  Negative Final    UREAPLASMA SPP JORGE 02/02/2021 Positive* Negative Final    C. trachomatis by JORGE 02/02/2021 Negative  Negative Final    N. gonorrhoeae by JORGE 02/02/2021 Negative  Negative Final         Radiographic Studies:  XR Results (most recent):  No results found for this or any previous visit.      REAL Results (most recent):  No results found for this or any previous visit. CT Results (most recent):  No results found for this or any previous visit. DEXA Results (most recent):  No results found for this or any previous visit. MRI Results (most recent):  No results found for this or any previous visit. Assessment/Plan:       ICD-10-CM ICD-9-CM    1. Routine adult health maintenance  Z00.00 V70.0 HEMOGLOBIN A1C WITH EAG      CBC WITH AUTOMATED DIFF      METABOLIC PANEL, COMPREHENSIVE      LIPID PANEL   2. Lip laceration, subsequent encounter  S01.511D V58.89      873.43    3. Prehypertension  R03.0 796.2    4. Screen for colon cancer  Z12.11 V76.51 REFERRAL FOR COLONOSCOPY         Healthcare Maintenance:  - Preventive measures are reviewed as per above  - Up to date on routine interventions except as noted above  - Orders placed to update gaps as noted  - Notes: Labs ordered as noted above. Colonoscopy referral as noted. Lip laceration:   - Well-healed. Sutures removed today. Pre-hypertension:  - Elevated blood pressure reading, but not requiring pharmacotherapy at current level. Handout provided on low-sodium diet. Continuing with current dietary management. Kristy Higgins MD    Please note that this dictation was completed with [employer], the computer voice recognition software. Quite often unanticipated grammatical, syntax, homophones, and other interpretive errors are inadvertently transcribed by the computer software. Please disregard these errors. Please excuse any errors that have escaped final proofreading. Follow-up and Dispositions    · Return in about 6 months (around 11/24/2022).

## 2022-05-24 ENCOUNTER — OFFICE VISIT (OUTPATIENT)
Dept: INTERNAL MEDICINE CLINIC | Age: 62
End: 2022-05-24
Payer: COMMERCIAL

## 2022-05-24 VITALS
BODY MASS INDEX: 28.97 KG/M2 | OXYGEN SATURATION: 96 % | TEMPERATURE: 98.1 F | DIASTOLIC BLOOD PRESSURE: 82 MMHG | SYSTOLIC BLOOD PRESSURE: 138 MMHG | HEART RATE: 69 BPM | HEIGHT: 73 IN | WEIGHT: 218.6 LBS | RESPIRATION RATE: 20 BRPM

## 2022-05-24 DIAGNOSIS — Z00.00 ROUTINE ADULT HEALTH MAINTENANCE: Primary | ICD-10-CM

## 2022-05-24 DIAGNOSIS — Z12.11 SCREEN FOR COLON CANCER: ICD-10-CM

## 2022-05-24 DIAGNOSIS — R03.0 PREHYPERTENSION: ICD-10-CM

## 2022-05-24 DIAGNOSIS — S01.511D LIP LACERATION, SUBSEQUENT ENCOUNTER: ICD-10-CM

## 2022-05-24 PROCEDURE — 99213 OFFICE O/P EST LOW 20 MIN: CPT | Performed by: INTERNAL MEDICINE

## 2022-05-24 RX ORDER — EFINACONAZOLE 100 MG/ML
SOLUTION TOPICAL
COMMUNITY
Start: 2022-04-13 | End: 2023-04-08

## 2022-05-24 NOTE — PROGRESS NOTES
Chief Complaint   Patient presents with    Suture Removal     Visit Vitals  /82   Pulse 69   Temp 98.1 °F (36.7 °C) (Oral)   Resp 20   Ht 6' 1\" (1.854 m)   Wt 218 lb 9.6 oz (99.2 kg)   SpO2 96%   BMI 28.84 kg/m²         1. \"Have you been to the ER, urgent care clinic since your last visit? Hospitalized since your last visit? \" No    2. \"Have you seen or consulted any other health care providers outside of the [address] [place] [address] since your last visit? \" No     3. For patients aged 39-70: Has the patient had a colonoscopy / FIT/ Cologuard? No      If the patient is female:    4. For patients aged 41-77: Has the patient had a mammogram within the past 2 years? No      5. For patients aged 21-65: Has the patient had a pap smear?  No

## 2022-05-24 NOTE — PROGRESS NOTES
Removed sutures from patient's upper lip without difficulty. No signs of redness at the suture lines. Discussed signs of infection. F/up as regularly scheduled.

## 2022-05-24 NOTE — PATIENT INSTRUCTIONS
DASH Diet: Care Instructions  Your Care Instructions     The DASH diet is an eating plan that can help lower your blood pressure. DASH stands for Dietary Approaches to Stop Hypertension. Hypertension is high blood pressure. The DASH diet focuses on eating foods that are high in calcium, potassium, and magnesium. These nutrients can lower blood pressure. The foods that are highest in these nutrients are fruits, vegetables, low-fat dairy products, nuts, seeds, and legumes. But taking calcium, potassium, and magnesium supplements instead of eating foods that are high in those nutrients does not have the same effect. The DASH diet also includes whole grains, fish, and poultry. The DASH diet is one of several lifestyle changes your doctor may recommend to lower your high blood pressure. Your doctor may also want you to decrease the amount of sodium in your diet. Lowering sodium while following the DASH diet can lower blood pressure even further than just the DASH diet alone. Follow-up care is a key part of your treatment and safety. Be sure to make and go to all appointments, and call your doctor if you are having problems. It's also a good idea to know your test results and keep a list of the medicines you take. How can you care for yourself at home? Following the DASH diet  · Eat 4 to 5 servings of fruit each day. A serving is 1 medium-sized piece of fruit, ½ cup chopped or canned fruit, 1/4 cup dried fruit, or 4 ounces (½ cup) of fruit juice. Choose fruit more often than fruit juice. · Eat 4 to 5 servings of vegetables each day. A serving is 1 cup of lettuce or raw leafy vegetables, ½ cup of chopped or cooked vegetables, or 4 ounces (½ cup) of vegetable juice. Choose vegetables more often than vegetable juice. · Get 2 to 3 servings of low-fat and fat-free dairy each day. A serving is 8 ounces of milk, 1 cup of yogurt, or 1 ½ ounces of cheese. · Eat 6 to 8 servings of grains each day.  A serving is 1 slice of bread, 1 ounce of dry cereal, or ½ cup of cooked rice, pasta, or cooked cereal. Try to choose whole-grain products as much as possible. · Limit lean meat, poultry, and fish to 2 servings each day. A serving is 3 ounces, about the size of a deck of cards. · Eat 4 to 5 servings of nuts, seeds, and legumes (cooked dried beans, lentils, and split peas) each week. A serving is 1/3 cup of nuts, 2 tablespoons of seeds, or ½ cup of cooked beans or peas. · Limit fats and oils to 2 to 3 servings each day. A serving is 1 teaspoon of vegetable oil or 2 tablespoons of salad dressing. · Limit sweets and added sugars to 5 servings or less a week. A serving is 1 tablespoon jelly or jam, ½ cup sorbet, or 1 cup of lemonade. · Eat less than 2,300 milligrams (mg) of sodium a day. If you limit your sodium to 1,500 mg a day, you can lower your blood pressure even more. · Be aware that all of these are the suggested number of servings for people who eat 1,800 to 2,000 calories a day. Your recommended number of servings may be different if you need more or fewer calories. Tips for success  · Start small. Do not try to make dramatic changes to your diet all at once. You might feel that you are missing out on your favorite foods and then be more likely to not follow the plan. Make small changes, and stick with them. Once those changes become habit, add a few more changes. · Try some of the following:  ? Make it a goal to eat a fruit or vegetable at every meal and at snacks. This will make it easy to get the recommended amount of fruits and vegetables each day. ? Try yogurt topped with fruit and nuts for a snack or healthy dessert. ? Add lettuce, tomato, cucumber, and onion to sandwiches. ? Combine a ready-made pizza crust with low-fat mozzarella cheese and lots of vegetable toppings. Try using tomatoes, squash, spinach, broccoli, carrots, cauliflower, and onions. ?  Have a variety of cut-up vegetables with a low-fat dip as an appetizer instead of chips and dip. ? Sprinkle sunflower seeds or chopped almonds over salads. Or try adding chopped walnuts or almonds to cooked vegetables. ? Try some vegetarian meals using beans and peas. Add garbanzo or kidney beans to salads. Make burritos and tacos with mashed france beans or black beans. Where can you learn more? Go to http://www.peacock.com/  Enter H967 in the search box to learn more about \"DASH Diet: Care Instructions. \"  Current as of: January 10, 2022               Content Version: 13.2  © 2006-2022 [employer]. Care instructions adapted under license by [employer] (which disclaims liability or warranty for this information). If you have questions about a medical condition or this instruction, always ask your healthcare professional. [facility] [address] any warranty or liability for your use of this information. Learning About Colonoscopy  What is a colonoscopy? A colonoscopy is a test (also called a procedure) that lets a doctor look inside your large intestine. The doctor uses a thin, lighted tube called a colonoscope. The doctor uses it to look for small growths called polyps, colon or rectal cancer (colorectal cancer), or other problems like bleeding. During the procedure, the doctor can take samples of tissue. The samples can then be checked for cancer or other conditions. The doctor can also take out polyps. How is a colonoscopy done? This procedure is done in a doctor's office or a clinic or hospital. You will get medicine to help you relax and not feel pain. Some people find that they don't remember having the test because of the medicine. The doctor gently moves the colonoscope, or scope, through the colon. The scope is also a small video camera. It lets the doctor see the colon and take pictures. How do you prepare for the procedure?   You need to clean out your colon before the procedure so the doctor can see your colon. This depends on which \"colon prep\" your doctor recommends. To clean out your colon, you'll do a \"colon prep\" before the test. This means you stop eating solid foods and drink only clear liquids. You can have water, tea, coffee, clear juices, clear broths, flavored ice pops, and gelatin (such as Jell-O). Do not drink anything red or purple. The day or night before the procedure, you drink a large amount of a special liquid. This causes loose, frequent stools. You will go to the bathroom a lot. Your doctor may have you drink part of the liquid the evening before and the rest on the day of the test. It's very important to drink all of the liquid. If you have problems drinking it, call your doctor. Arrange to have someone take you home after the test.  What can you expect after a colonoscopy? Your doctor will tell you when you can eat and do your usual activities. Drink a lot of fluid after the test to replace the fluids you may have lost during the colon prep. But don't drink alcohol. Your doctor will talk to you about when you'll need your next colonoscopy. The results of your test and your risk for colorectal cancer will help your doctor decide how often you need to be checked. After the test, you may be bloated or have gas pains. You may need to pass gas. If a biopsy was done or a polyp was removed, you may have streaks of blood in your stool (feces) for a few days. Check with your doctor to see when it is safe to take aspirin and nonsteroidal anti-inflammatory drugs (NSAIDs) again. Problems such as heavy rectal bleeding may not occur until several weeks after the test. This isn't common. But it can happen after polyps are removed. Follow-up care is a key part of your treatment and safety. Be sure to make and go to all appointments, and call your doctor if you are having problems. It's also a good idea to know your test results and keep a list of the medicines you take.   Where can you learn more? Go to http://www.gray.com/  Enter Z368 in the search box to learn more about \"Learning About Colonoscopy. \"  Current as of: September 8, 2021               Content Version: 13.2  © 3927-1069 [employer]. Care instructions adapted under license by [employer] (which disclaims liability or warranty for this information). If you have questions about a medical condition or this instruction, always ask your healthcare professional. [place] [address] any warranty or liability for your use of this information.

## 2022-05-26 ENCOUNTER — TELEPHONE (OUTPATIENT)
Dept: INTERNAL MEDICINE CLINIC | Age: 62
End: 2022-05-26

## 2022-05-26 ENCOUNTER — PATIENT MESSAGE (OUTPATIENT)
Dept: INTERNAL MEDICINE CLINIC | Age: 62
End: 2022-05-26

## 2022-05-26 NOTE — TELEPHONE ENCOUNTER
Patient called back stating when he was here on 5/24/22  he thinks that one of the sutures was missed. He states he can fell something on his lip. He is concerned about getting an infection. Please advise.

## 2022-05-26 NOTE — TELEPHONE ENCOUNTER
Hi Mr. Yessi Bullock,    I think that given your age (< 72years old), vaccination history (+vaccinated against COVID-19), and lack of other comorbidities (such as diabetes, obesity, or immunocompromise, for example), you probably do not warrant initiation of Paxlovid if your symptoms remain mild. I agree with monitoring your symptoms very closely. You may wish to go to the ER to have your lip evaluated. Because of your COVID status, it is probably better to have this looked at in a negative flow room to reduce the risk to healthcare providers who would need to examine you and remove the suture, if necessary.      Hope this helps,    Dr. Christi Salmeron

## 2022-05-26 NOTE — TELEPHONE ENCOUNTER
From: Ivette Camp  To: [facility]  Sent: 5/26/2022 2:57 PM EDT  Subject: Dr. Ashley Burger positive test today-Paxlovid PLEASE    I tested positive for COVID at 8am this morning. I've left 5 messages for Dr. Sampson Kayser. Could not reach anyone until 2:35 pm today. Very frustrating! 1) PAXLOVID- can you please order an RX for me? 2) LIP SUTURES- removed on 5-24-22 by Marielena Trotter. I think she left a suture in my lip that is sticking out of my lip. What to do? I have a lab appointment on Tuesday 5/31/22 at 8:40 am. Can someone look at this then? or what to do?

## 2022-05-26 NOTE — TELEPHONE ENCOUNTER
Patient states he has covid. He would like a prescription and would like to know to what to do. Please advise.      [phone]

## 2022-05-26 NOTE — TELEPHONE ENCOUNTER
Regarding COVID infection: while the patient is older, he does not otherwise have significant comorbidities that would significantly increase his risk for severe COVID-19. Notably, he is not using any medications. He is up-to-date with regard to COVID-19 vaccinations. I think it would be reasonable to forego Paxlovid at this time although he should continue to monitor himself closely for worsening symptoms and present to the hospital or advise if his symptoms should worsen; Paxlovid can be prescribed at any point within the first 5 days of developing symptoms. Recommended the patient be seen in the ER where he can be seen in a negative flow room for removal of any potential remaining sutures. We do not have such a space in our clinic.

## 2022-06-13 ENCOUNTER — APPOINTMENT (OUTPATIENT)
Dept: INTERNAL MEDICINE CLINIC | Age: 62
End: 2022-06-13

## 2022-06-13 DIAGNOSIS — Z00.00 ROUTINE ADULT HEALTH MAINTENANCE: ICD-10-CM

## 2022-06-13 LAB
ALBUMIN SERPL-MCNC: 4.1 G/DL (ref 3.5–5)
ALBUMIN/GLOB SERPL: 1.2 {RATIO} (ref 1.1–2.2)
ALP SERPL-CCNC: 64 U/L (ref 45–117)
ALT SERPL-CCNC: 41 U/L (ref 12–78)
ANION GAP SERPL CALC-SCNC: 5 MMOL/L (ref 5–15)
AST SERPL-CCNC: 17 U/L (ref 15–37)
BASOPHILS # BLD: 0.1 K/UL (ref 0–0.1)
BASOPHILS NFR BLD: 1 % (ref 0–1)
BILIRUB SERPL-MCNC: 0.5 MG/DL (ref 0.2–1)
BUN SERPL-MCNC: 20 MG/DL (ref 6–20)
BUN/CREAT SERPL: 23 (ref 12–20)
CALCIUM SERPL-MCNC: 9.3 MG/DL (ref 8.5–10.1)
CHLORIDE SERPL-SCNC: 106 MMOL/L (ref 97–108)
CHOLEST SERPL-MCNC: 239 MG/DL
CO2 SERPL-SCNC: 27 MMOL/L (ref 21–32)
CREAT SERPL-MCNC: 0.88 MG/DL (ref 0.7–1.3)
DIFFERENTIAL METHOD BLD: NORMAL
EOSINOPHIL # BLD: 0.4 K/UL (ref 0–0.4)
EOSINOPHIL NFR BLD: 6 % (ref 0–7)
ERYTHROCYTE [DISTWIDTH] IN BLOOD BY AUTOMATED COUNT: 13.1 % (ref 11.5–14.5)
EST. AVERAGE GLUCOSE BLD GHB EST-MCNC: 111 MG/DL
GLOBULIN SER CALC-MCNC: 3.5 G/DL (ref 2–4)
GLUCOSE SERPL-MCNC: 110 MG/DL (ref 65–100)
HBA1C MFR BLD: 5.5 % (ref 4–5.6)
HCT VFR BLD AUTO: 49.1 % (ref 36.6–50.3)
HDLC SERPL-MCNC: 57 MG/DL
HDLC SERPL: 4.2 {RATIO} (ref 0–5)
HGB BLD-MCNC: 15.6 G/DL (ref 12.1–17)
IMM GRANULOCYTES # BLD AUTO: 0 K/UL (ref 0–0.04)
IMM GRANULOCYTES NFR BLD AUTO: 0 % (ref 0–0.5)
LDLC SERPL CALC-MCNC: 160.6 MG/DL (ref 0–100)
LYMPHOCYTES # BLD: 2.5 K/UL (ref 0.8–3.5)
LYMPHOCYTES NFR BLD: 43 % (ref 12–49)
MCH RBC QN AUTO: 29.7 PG (ref 26–34)
MCHC RBC AUTO-ENTMCNC: 31.8 G/DL (ref 30–36.5)
MCV RBC AUTO: 93.3 FL (ref 80–99)
MONOCYTES # BLD: 0.4 K/UL (ref 0–1)
MONOCYTES NFR BLD: 8 % (ref 5–13)
NEUTS SEG # BLD: 2.4 K/UL (ref 1.8–8)
NEUTS SEG NFR BLD: 42 % (ref 32–75)
NRBC # BLD: 0 K/UL (ref 0–0.01)
NRBC BLD-RTO: 0 PER 100 WBC
PLATELET # BLD AUTO: 262 K/UL (ref 150–400)
PMV BLD AUTO: 11.4 FL (ref 8.9–12.9)
POTASSIUM SERPL-SCNC: 4.2 MMOL/L (ref 3.5–5.1)
PROT SERPL-MCNC: 7.6 G/DL (ref 6.4–8.2)
RBC # BLD AUTO: 5.26 M/UL (ref 4.1–5.7)
SODIUM SERPL-SCNC: 138 MMOL/L (ref 136–145)
TRIGL SERPL-MCNC: 107 MG/DL (ref ?–150)
VLDLC SERPL CALC-MCNC: 21.4 MG/DL
WBC # BLD AUTO: 5.7 K/UL (ref 4.1–11.1)

## 2022-06-14 DIAGNOSIS — E78.00 PURE HYPERCHOLESTEROLEMIA: Primary | ICD-10-CM

## 2022-06-14 RX ORDER — ATORVASTATIN CALCIUM 10 MG/1
10 TABLET, FILM COATED ORAL DAILY
Qty: 30 TABLET | Refills: 11 | Status: SHIPPED | OUTPATIENT
Start: 2022-06-14 | End: 2023-06-09

## 2022-06-14 NOTE — PROGRESS NOTES
Labs reviewed. LDL elevated, ASCVD risk estimated at 16% at 10 years. atorvastatin would be a reason option for treatment. Low dose atorvastatin is ordered; target LDL would be < 100 mg/dL.

## 2022-06-14 NOTE — PROGRESS NOTES
10-year Cardiovascular Risk Susanjaymie Rivera 2013): The 10-year ASCVD risk score (Kayla Mckinley, et al., 2013) is: 14.1%    Values used to calculate the score:      Age: 57 years      Sex: Male      Is Non- : No      Diabetic: No      Tobacco smoker: No      Systolic Blood Pressure: 592 mmHg      Is BP treated: No      HDL Cholesterol: 57 MG/DL      Total Cholesterol: 239 MG/DL       Lab Results   Component Value Date/Time    Cholesterol, total 239 (H) 06/13/2022 08:44 AM    HDL Cholesterol 57 06/13/2022 08:44 AM    LDL, calculated 160.6 (H) 06/13/2022 08:44 AM    VLDL, calculated 21.4 06/13/2022 08:44 AM    Triglyceride 107 06/13/2022 08:44 AM    CHOL/HDL Ratio 4.2 06/13/2022 08:44 AM       ICD-10-CM ICD-9-CM    1.  Pure hypercholesterolemia  E78.00 272.0 atorvastatin (LIPITOR) 10 mg tablet

## 2022-06-17 ENCOUNTER — TELEPHONE (OUTPATIENT)
Dept: INTERNAL MEDICINE CLINIC | Age: 62
End: 2022-06-17

## 2022-06-17 NOTE — TELEPHONE ENCOUNTER
Called patient to schedule an in office visit to address additional questions regarding his cholesterol. Patient stated he is going on vacation next week and would not be available but really just wanted to discuss the correlation between the keto diet and high cholesterol. Stated he has been on the keto diet for two years and feels that may likely be the cause of his high cholesterol. He would like to know the appropriate amount of cholesterol that he should not go above in a day and stated he would prefer to change his diet first before taking a statin.

## 2022-06-17 NOTE — TELEPHONE ENCOUNTER
Lab Results   Component Value Date/Time    Cholesterol, total 239 (H) 06/13/2022 08:44 AM    HDL Cholesterol 57 06/13/2022 08:44 AM    LDL, calculated 160.6 (H) 06/13/2022 08:44 AM    VLDL, calculated 21.4 06/13/2022 08:44 AM    Triglyceride 107 06/13/2022 08:44 AM    CHOL/HDL Ratio 4.2 06/13/2022 08:44 AM     10-year Cardiovascular Risk Mayamaryam Espitia 2013): The 10-year ASCVD risk score (Heber Mejía et al., 2013) is: 14.1%    Values used to calculate the score:      Age: 57 years      Sex: Male      Is Non- : No      Diabetic: No      Tobacco smoker: No      Systolic Blood Pressure: 832 mmHg      Is BP treated: No      HDL Cholesterol: 57 MG/DL      Total Cholesterol: 239 MG/DL     I am not familiar with the absolute dietary limits on oral intake of cholesterol. The patient may follow the FDA listed nutritional requirements as listed on most food packages. Referral to dietary medicine may be reasonable. I am skeptical that this is all related to dietary intake, but if the patient is able to make significant changes to his diet then we may defer adding on the statin medication at this time. Recalculation of his 10-year cardiovascular risk in a few months time after repeat lipid panel would be reasonable, although I am relatively sure that he will still require statin medication at that time.

## 2022-06-22 NOTE — PROGRESS NOTES
No additional comment [Fatigue] : fatigue [Memory Loss] : memory loss [Negative] : Integumentary [Heartburn] : heartburn [Dryness] : no dryness  [Muscle Weakness] : no muscle weakness [Dizziness] : no dizziness [Fainting] : no fainting [Unsteady Walking] : no ataxia [Suicidal] : not suicidal [Depression] : no depression [Swollen Glands] : no swollen glands [FreeTextEntry9] : g [de-identified] : improving memory loss

## 2022-08-31 NOTE — PROGRESS NOTES
Subjective:     Chief Complaint   Patient presents with    Physical       Radhaclara Barr is a 57 y.o. M. He has a past medical history of hypercholesterolemia and vitamin D deficiency, among other problems. I reviewed and updated the medical record. I most recently saw this patient in late May for suture removal.  He had previously hit himself in the upper lip with a pickleball racquet, and had suffered a lip laceration. He had also complained at the time of some neck pain and had wondered if this could be secondary to a blockage in his carotid artery although he had otherwise noted no personal history of smoking or any personal history of heart disease or other cardiovascular disease. Physical examination at the time had been generally unremarkable. He had been referred for routine screening for colon cancer, but was otherwise referred for routine laboratory studies and continued on his usual medication regimen. Today, the patient comes in for routine preventive care and a physical examination as well as follow-up on his chronic medical concerns. He reports feeling fine overall today and has no significant acute somatic complaints. He has numerous questions regarding potential vaccinations as well as other preventive care measures. While he has not yet gone for his colonoscopy, he says that he will do so, and has the printed out referral ready to go. He inquires as to whether or not he would benefit from HPV vaccination. He has had exposure to this in the past, and his girlfriend was told that she also had HPV. He is otherwise asymptomatic without any other history of genital warts or anal warts. He does have a history of erectile dysfunction, for which Viagra had been effective for him in the past, and he requests a refill of this. He denies any chest pain, shortness of breath, or changes in exercise tolerance.   In fact, he continues be highly active, playing pickle ball on a regular basis. He did not end up starting the atorvastatin that had been ordered for him previously. As I note below, his ASCVD risk remains intermediate in the 13 to 14% range. He denies any family history of coronary artery disease. He does note a family history notable for male breast cancer; he himself has not noted any other breast abnormalities, and furthermore denies any fevers or chills, or other constitutional or systemic complaints. He is reluctant to consider medications for his hypercholesterolemia. He continues on a keto diet, and wonders if he could be referred to a nutritionist to further discuss his dietary choices. He does not measure his blood pressure readings at home. We discussed his blood pressure today, which has been persistently elevated in the 130 to 649 mmHg systolic range over the past few months. He does not currently take any medication for blood pressure. Routine Healthcare Maintenance issues are reviewed and discussed with the patient as noted below. Orders to update gaps in healthcare maintenance were placed as noted below in the Assessment and Plan, where applicable. His review of systems is otherwise negative. 10-year Cardiovascular Risk Mary Rodgers, 2013):   The 10-year ASCVD risk score (Olivia Julian, et al., 2013) is: 14.1%    Values used to calculate the score:      Age: 57 years      Sex: Male      Is Non- : No      Diabetic: No      Tobacco smoker: No      Systolic Blood Pressure: 457 mmHg      Is BP treated: No      HDL Cholesterol: 57 MG/DL      Total Cholesterol: 239 MG/DL       Past Medical History:  Past Medical History:   Diagnosis Date    ED (erectile dysfunction) 08/27/2018    History of colonic polyps 08/27/2018    History of headache 08/03/2018    History of hearing loss 08/27/2018    right side    Hypercholesterolemia     Synovial cyst of lumbar spine     Vitamin D deficiency 08/27/2018       Past Surgical Histor:  Past Surgical History: Procedure Laterality Date    HX ORTHOPAEDIC         Allergies: Allergies   Allergen Reactions    Ciprofloxacin Other (comments)     Eye drops       Medications:  Current Outpatient Medications   Medication Sig Dispense Refill    sildenafil citrate (VIAGRA) 100 mg tablet Take 1 Tablet by mouth as needed for Erectile Dysfunction. 18 Tablet 11    efinaconazole (Jublia) nithya topical solution Apply to both big toenails daily      amino acids/mv,iron,min (OCUVITE EXTRA PO) Take  by mouth.      multivitamin (ONE A DAY) tablet Take 1 Tab by mouth daily. cholecalciferol (VITAMIN D3) (1000 Units /25 mcg) tablet Take  by mouth daily. Omega-3 Fatty Acids 500 mg cap Take  by mouth. atorvastatin (LIPITOR) 10 mg tablet Take 1 Tablet by mouth daily for 360 days. 30 Tablet 11    doxycycline (VIBRAMYCIN) 100 mg capsule Take 1 Capsule by mouth two (2) times a day.  20 Capsule 0       Social History:  Social History     Socioeconomic History    Marital status:    Tobacco Use    Smoking status: Never    Smokeless tobacco: Never   Substance and Sexual Activity    Alcohol use: Yes     Comment: occasionally    Drug use: No       Family History:  Family History   Problem Relation Age of Onset    Cancer Father         breast cancer    Emphysema Father        Immunizations:  Immunization History   Administered Date(s) Administered    COVID-19, PFIZER PURPLE top, DILUTE for use, (age 15 y+), IM, 30mcg/0.3mL 11/21/2021    Influenza Vaccine 10/15/2018    Influenza, FLUARIX, FLULAVAL, FLUZONE (age 10 mo+) AND AFLURIA, (age 1 y+), PF, 0.5mL 11/21/2021    Tdap 05/18/2022    Zoster Recombinant 07/06/2019, 05/14/2020        Healthcare Maintenance:  Health Maintenance   Topic Date Due    Colorectal Cancer Screening Combo  Never done    COVID-19 Vaccine (2 - Pfizer series) 12/12/2021    Depression Screen  02/01/2022    Flu Vaccine (1) 09/01/2022    Lipid Screen  06/13/2023    DTaP/Tdap/Td series (2 - Td or Tdap) 05/18/2032 Hepatitis C Screening  Completed    Shingrix Vaccine Age 50>  Completed    Pneumococcal 0-64 years  Aged Out        Review of Systems:  ROS:  Review of Systems   Constitutional: Negative. HENT: Negative. Eyes: Negative. Respiratory: Negative. Cardiovascular: Negative. Gastrointestinal: Negative. Genitourinary: Negative. Musculoskeletal: Negative. Skin: Negative. Neurological: Negative. Endo/Heme/Allergies: Negative. Psychiatric/Behavioral: Negative. ROS otherwise negative      Objective:     Vital Signs:  Visit Vitals  /84 (BP 1 Location: Left upper arm, BP Patient Position: Sitting, BP Cuff Size: Large adult)   Pulse 81   Resp 20   Ht 6' 1\" (1.854 m)   Wt 218 lb 8 oz (99.1 kg)   SpO2 98%   BMI 28.83 kg/m²       BMI:  Body mass index is 28.83 kg/m². Physical Examination:  Physical Exam  Vitals reviewed. Constitutional:       Appearance: Normal appearance. HENT:      Head: Normocephalic and atraumatic. Nose: Nose normal.      Mouth/Throat:      Mouth: Mucous membranes are moist.   Eyes:      Extraocular Movements: Extraocular movements intact. Conjunctiva/sclera: Conjunctivae normal.      Pupils: Pupils are equal, round, and reactive to light. Cardiovascular:      Rate and Rhythm: Normal rate and regular rhythm. Pulses: Normal pulses. Heart sounds: Normal heart sounds. No murmur heard. No friction rub. No gallop. Pulmonary:      Effort: Pulmonary effort is normal. No respiratory distress. Breath sounds: Normal breath sounds. No wheezing, rhonchi or rales. Abdominal:      General: Bowel sounds are normal. There is no distension. Palpations: Abdomen is soft. There is no mass. Tenderness: There is no abdominal tenderness. There is no guarding or rebound. Musculoskeletal:         General: No tenderness, deformity or signs of injury. Normal range of motion. Cervical back: Normal range of motion and neck supple. Right lower leg: No edema. Left lower leg: No edema. Skin:     General: Skin is warm and dry. Findings: No bruising, lesion or rash. Neurological:      General: No focal deficit present. Mental Status: He is alert and oriented to person, place, and time. Mental status is at baseline. Cranial Nerves: No cranial nerve deficit. Sensory: No sensory deficit. Motor: No weakness. Coordination: Coordination normal.      Gait: Gait normal.      Deep Tendon Reflexes: Reflexes normal.   Psychiatric:         Mood and Affect: Mood normal.         Behavior: Behavior normal.        Physical exam otherwise negative    Diagnostic Testing:    Laboratory Studies:  Appointment on 06/13/2022   Component Date Value Ref Range Status    Cholesterol, total 06/13/2022 239 (A) <200 MG/DL Final    Triglyceride 06/13/2022 107  <150 MG/DL Final    Based on NCEP-ATP III:  Triglycerides <150 mg/dL  is considered normal, 150-199 mg/dL  borderline high,  200-499 mg/dL high and  greater than or equal to 500 mg/dL very high. HDL Cholesterol 06/13/2022 57  MG/DL Final    Based on NCEP ATP III, HDL Cholesterol <40 mg/dL is considered low and >60 mg/dL is elevated.     LDL, calculated 06/13/2022 160.6 (A) 0 - 100 MG/DL Final    Comment: Based on the NCEP-ATP: LDL-C concentrations are considered  optimal <100 mg/dL,  near optimal/above Normal 100-129 mg/dL  Borderline High: 130-159, High: 160-189 mg/dL  Very High: Greater than or equal to 190 mg/dL      VLDL, calculated 06/13/2022 21.4  MG/DL Final    CHOL/HDL Ratio 06/13/2022 4.2  0.0 - 5.0   Final    Sodium 06/13/2022 138  136 - 145 mmol/L Final    Potassium 06/13/2022 4.2  3.5 - 5.1 mmol/L Final    Chloride 06/13/2022 106  97 - 108 mmol/L Final    CO2 06/13/2022 27  21 - 32 mmol/L Final    Anion gap 06/13/2022 5  5 - 15 mmol/L Final    Glucose 06/13/2022 110 (A) 65 - 100 mg/dL Final    BUN 06/13/2022 20  6 - 20 MG/DL Final    Creatinine 06/13/2022 0.88  0.70 - 1.30 MG/DL Final    BUN/Creatinine ratio 06/13/2022 23 (A) 12 - 20   Final    GFR est AA 06/13/2022 >60  >60 ml/min/1.73m2 Final    GFR est non-AA 06/13/2022 >60  >60 ml/min/1.73m2 Final    Estimated GFR is calculated using the IDMS-traceable Modification of Diet in Renal Disease (MDRD) Study equation, reported for both  Americans (GFRAA) and non- Americans (GFRNA), and normalized to 1.73m2 body surface area. The physician must decide which value applies to the patient. Calcium 06/13/2022 9.3  8.5 - 10.1 MG/DL Final    Bilirubin, total 06/13/2022 0.5  0.2 - 1.0 MG/DL Final    ALT (SGPT) 06/13/2022 41  12 - 78 U/L Final    AST (SGOT) 06/13/2022 17  15 - 37 U/L Final    Alk. phosphatase 06/13/2022 64  45 - 117 U/L Final    Protein, total 06/13/2022 7.6  6.4 - 8.2 g/dL Final    Albumin 06/13/2022 4.1  3.5 - 5.0 g/dL Final    Globulin 06/13/2022 3.5  2.0 - 4.0 g/dL Final    A-G Ratio 06/13/2022 1.2  1.1 - 2.2   Final    WBC 06/13/2022 5.7  4.1 - 11.1 K/uL Final    RBC 06/13/2022 5.26  4.10 - 5.70 M/uL Final    HGB 06/13/2022 15.6  12.1 - 17.0 g/dL Final    HCT 06/13/2022 49.1  36.6 - 50.3 % Final    MCV 06/13/2022 93.3  80.0 - 99.0 FL Final    MCH 06/13/2022 29.7  26.0 - 34.0 PG Final    MCHC 06/13/2022 31.8  30.0 - 36.5 g/dL Final    RDW 06/13/2022 13.1  11.5 - 14.5 % Final    PLATELET 45/48/8957 002  150 - 400 K/uL Final    MPV 06/13/2022 11.4  8.9 - 12.9 FL Final    NRBC 06/13/2022 0.0  0  WBC Final    ABSOLUTE NRBC 06/13/2022 0.00  0.00 - 0.01 K/uL Final    NEUTROPHILS 06/13/2022 42  32 - 75 % Final    LYMPHOCYTES 06/13/2022 43  12 - 49 % Final    MONOCYTES 06/13/2022 8  5 - 13 % Final    EOSINOPHILS 06/13/2022 6  0 - 7 % Final    BASOPHILS 06/13/2022 1  0 - 1 % Final    IMMATURE GRANULOCYTES 06/13/2022 0  0.0 - 0.5 % Final    ABS. NEUTROPHILS 06/13/2022 2.4  1.8 - 8.0 K/UL Final    ABS. LYMPHOCYTES 06/13/2022 2.5  0.8 - 3.5 K/UL Final    ABS.  MONOCYTES 06/13/2022 0.4  0.0 - 1.0 K/UL Final ABS. EOSINOPHILS 06/13/2022 0.4  0.0 - 0.4 K/UL Final    ABS. BASOPHILS 06/13/2022 0.1  0.0 - 0.1 K/UL Final    ABS. IMM. GRANS. 06/13/2022 0.0  0.00 - 0.04 K/UL Final    DF 06/13/2022 AUTOMATED    Final    Hemoglobin A1c 06/13/2022 5.5  4.0 - 5.6 % Final    Comment: NEW METHOD  PLEASE NOTE NEW REFERENCE RANGE  (NOTE)  HbA1C Interpretive Ranges  <5.7              Normal  5.7 - 6.4         Consider Prediabetes  >6.5              Consider Diabetes      Est. average glucose 06/13/2022 111  mg/dL Final         Radiographic Studies:  XR Results (most recent):  No results found for this or any previous visit. REAL Results (most recent):  No results found for this or any previous visit. CT Results (most recent):  No results found for this or any previous visit. DEXA Results (most recent):  No results found for this or any previous visit. MRI Results (most recent):  No results found for this or any previous visit. Assessment/Plan:       ICD-10-CM ICD-9-CM    1. Routine adult health maintenance  Z00.00 V70.0       2. Prehypertension  M13.8 232.9 METABOLIC PANEL, COMPREHENSIVE      REFERRAL TO DIETITIAN      3. Hypercholesterolemia  E78.00 272.0 LIPID PANEL      METABOLIC PANEL, COMPREHENSIVE      REFERRAL TO DIETITIAN      4. Erectile dysfunction, unspecified erectile dysfunction type  N52.9 607.84 sildenafil citrate (VIAGRA) 100 mg tablet             Healthcare Maintenance:  - Preventive measures are reviewed as per above  - Up to date on routine interventions except as noted above  - Orders placed to update gaps as noted  - Notes: Discussed colonoscopy, patient says he will get this done as soon as possible. He is otherwise up-to-date. We discussed vaccinations. I noted to him that it would not be routinely recommended for patient of his age and prior exposure history to receive the Gardasil vaccine.   He is otherwise advised to get his COVID-19 vaccine and booster as well as his flu vaccine as these become available. Hyperlipidemia/Dyslipidemia:   - Summary of Cardiovascular Risks and Goals:     LDL goal is under 100  hypertension  hyperlipidemia  [place] 10-year risk 10-20%   -   Lab Results   Component Value Date/Time    LDL, calculated 160.6 (H) 06/13/2022 08:44 AM    HDL Cholesterol 57 06/13/2022 08:44 AM       - Relevant Cholesterol Meds:  Key Antihyperlipidemia Meds               Omega-3 Fatty Acids 500 mg cap (Taking) Take  by mouth. atorvastatin (LIPITOR) 10 mg tablet Take 1 Tablet by mouth daily for 360 days. - Cholesterol at target: no   - Does patient meet USPSTF and ACC/AHA indications for pharmacotherapy (e.g., statin): yes   - GI symptoms with meds: N/A   - Muscle aches with meds: N/A   - Other Adverse effects with meds: N/A   - Medication Plan:  checking labs   - Notes: Patient is reluctant to consider atorvastatin for his hyperlipidemia. This is not unreasonable, although certainly with his intermediate risk I would recommend this. He will undergo repeat lipid evaluation, and if his LDL remains elevated despite ongoing dietary modifications with his keto diet, then I anticipate discussing with the patient either initiation of atorvastatin again, versus CT coronary to do coronary artery calcium scoring. Referral to nutritionist placed as noted. Pre-hypertension:   - Home BP Readings: not doing   - Current Control: high-normal to elevated per ACC   - Target BP: Less than 140/90 mmHg   - Relevant BP Meds:  Key CAD CHF Meds               Omega-3 Fatty Acids 500 mg cap (Taking) Take  by mouth. atorvastatin (LIPITOR) 10 mg tablet Take 1 Tablet by mouth daily for 360 days.               - Plan: on no meds for BP and needs none at this time, dietary sodium restriction, regular aerobic exercise, weight loss   - Notes: Discussed pre-hypertension/ACC stage I hypertension with patient, advised dietary modifications, referral to nutrition placed.   No indication for medications at this time. Follow-up and Dispositions    Return in about 1 year (around 9/7/2023). I have reviewed the patient's medical history in detail and updated the computerized patient record. We had a prolonged discussion about these complex clinical issues and went over the various important aspects to consider. All questions were answered. Advised the patient to call back or return to office if symptoms do not improve, change in nature, or persist.     The patient was given an after visit summary or informed of [employer] [facility] which includes patient instructions, diagnoses, current medications, & vitals. he expressed understanding with the diagnosis and plan. Breanne Parker MD    Please note that this dictation was completed with [employer], the computer voice recognition software. Quite often unanticipated grammatical, syntax, homophones, and other interpretive errors are inadvertently transcribed by the computer software. Please disregard these errors. Please excuse any errors that have escaped final proofreading.

## 2022-09-07 ENCOUNTER — OFFICE VISIT (OUTPATIENT)
Dept: INTERNAL MEDICINE CLINIC | Age: 62
End: 2022-09-07
Payer: COMMERCIAL

## 2022-09-07 VITALS
BODY MASS INDEX: 28.96 KG/M2 | OXYGEN SATURATION: 98 % | DIASTOLIC BLOOD PRESSURE: 84 MMHG | WEIGHT: 218.5 LBS | SYSTOLIC BLOOD PRESSURE: 138 MMHG | RESPIRATION RATE: 20 BRPM | HEART RATE: 81 BPM | HEIGHT: 73 IN

## 2022-09-07 DIAGNOSIS — E78.00 HYPERCHOLESTEROLEMIA: ICD-10-CM

## 2022-09-07 DIAGNOSIS — N52.9 ERECTILE DYSFUNCTION, UNSPECIFIED ERECTILE DYSFUNCTION TYPE: ICD-10-CM

## 2022-09-07 DIAGNOSIS — R03.0 PREHYPERTENSION: ICD-10-CM

## 2022-09-07 DIAGNOSIS — Z00.00 ROUTINE ADULT HEALTH MAINTENANCE: Primary | ICD-10-CM

## 2022-09-07 PROCEDURE — 99396 PREV VISIT EST AGE 40-64: CPT | Performed by: INTERNAL MEDICINE

## 2022-09-07 PROCEDURE — 99214 OFFICE O/P EST MOD 30 MIN: CPT | Performed by: INTERNAL MEDICINE

## 2022-09-07 RX ORDER — SILDENAFIL 100 MG/1
100 TABLET, FILM COATED ORAL AS NEEDED
Qty: 18 TABLET | Refills: 11 | Status: SHIPPED | OUTPATIENT
Start: 2022-09-07

## 2022-09-07 NOTE — PATIENT INSTRUCTIONS
DASH Diet: Care Instructions  Your Care Instructions     The DASH diet is an eating plan that can help lower your blood pressure. DASH stands for Dietary Approaches to Stop Hypertension. Hypertension is high blood pressure. The DASH diet focuses on eating foods that are high in calcium, potassium, and magnesium. These nutrients can lower blood pressure. The foods that are highest in these nutrients are fruits, vegetables, low-fat dairy products, nuts, seeds, and legumes. But taking calcium, potassium, and magnesium supplements instead of eating foods that are high in those nutrients does not have the same effect. The DASH diet also includes whole grains, fish, and poultry. The DASH diet is one of several lifestyle changes your doctor may recommend to lower your high blood pressure. Your doctor may also want you to decrease the amount of sodium in your diet. Lowering sodium while following the DASH diet can lower blood pressure even further than just the DASH diet alone. Follow-up care is a key part of your treatment and safety. Be sure to make and go to all appointments, and call your doctor if you are having problems. It's also a good idea to know your test results and keep a list of the medicines you take. How can you care for yourself at home? Following the DASH diet  Eat 4 to 5 servings of fruit each day. A serving is 1 medium-sized piece of fruit, ½ cup chopped or canned fruit, 1/4 cup dried fruit, or 4 ounces (½ cup) of fruit juice. Choose fruit more often than fruit juice. Eat 4 to 5 servings of vegetables each day. A serving is 1 cup of lettuce or raw leafy vegetables, ½ cup of chopped or cooked vegetables, or 4 ounces (½ cup) of vegetable juice. Choose vegetables more often than vegetable juice. Get 2 to 3 servings of low-fat and fat-free dairy each day. A serving is 8 ounces of milk, 1 cup of yogurt, or 1 ½ ounces of cheese. Eat 6 to 8 servings of grains each day.  A serving is 1 slice of bread, 1 ounce of dry cereal, or ½ cup of cooked rice, pasta, or cooked cereal. Try to choose whole-grain products as much as possible. Limit lean meat, poultry, and fish to 2 servings each day. A serving is 3 ounces, about the size of a deck of cards. Eat 4 to 5 servings of nuts, seeds, and legumes (cooked dried beans, lentils, and split peas) each week. A serving is 1/3 cup of nuts, 2 tablespoons of seeds, or ½ cup of cooked beans or peas. Limit fats and oils to 2 to 3 servings each day. A serving is 1 teaspoon of vegetable oil or 2 tablespoons of salad dressing. Limit sweets and added sugars to 5 servings or less a week. A serving is 1 tablespoon jelly or jam, ½ cup sorbet, or 1 cup of lemonade. Eat less than 2,300 milligrams (mg) of sodium a day. If you limit your sodium to 1,500 mg a day, you can lower your blood pressure even more. Be aware that all of these are the suggested number of servings for people who eat 1,800 to 2,000 calories a day. Your recommended number of servings may be different if you need more or fewer calories. Tips for success  Start small. Do not try to make dramatic changes to your diet all at once. You might feel that you are missing out on your favorite foods and then be more likely to not follow the plan. Make small changes, and stick with them. Once those changes become habit, add a few more changes. Try some of the following:  Make it a goal to eat a fruit or vegetable at every meal and at snacks. This will make it easy to get the recommended amount of fruits and vegetables each day. Try yogurt topped with fruit and nuts for a snack or healthy dessert. Add lettuce, tomato, cucumber, and onion to sandwiches. Combine a ready-made pizza crust with low-fat mozzarella cheese and lots of vegetable toppings. Try using tomatoes, squash, spinach, broccoli, carrots, cauliflower, and onions.   Have a variety of cut-up vegetables with a low-fat dip as an appetizer instead of chips and dip.  Sprinkle sunflower seeds or chopped almonds over salads. Or try adding chopped walnuts or almonds to cooked vegetables. Try some vegetarian meals using beans and peas. Add garbanzo or kidney beans to salads. Make burritos and tacos with mashed france beans or black beans. Where can you learn more? Go to http://www.peacock.com/  Enter H967 in the search box to learn more about \"DASH Diet: Care Instructions. \"  Current as of: January 10, 2022               Content Version: 13.2  © 3317-3431 [employer]. Care instructions adapted under license by [employer] (which disclaims liability or warranty for this information). If you have questions about a medical condition or this instruction, always ask your healthcare professional. [address] any warranty or liability for your use of this information. Learning About the [address] Diet  What is the Mediterranean diet? The Mediterranean diet is a style of eating rather than a diet plan. It features foods eaten in [place], [place], [place] and [place], and other countries along the [facility]. It emphasizes eating foods like fish, fruits, vegetables, beans, high-fiber breads and whole grains, nuts, and olive oil. This style of eating includes limited red meat, cheese, and sweets. Why choose the Mediterranean diet? A Mediterranean-style diet may improve heart health. It contains more fat than other heart-healthy diets. But the fats are mainly from nuts, unsaturated oils (such as fish oils and olive oil), and certain nut or seed oils (such as canola, soybean, or flaxseed oil). These fats may help protect the heart and blood vessels. How can you get started on the Mediterranean diet? Here are some things you can do to switch to a more Mediterranean way of eating.   What to eat  Eat a variety of fruits and vegetables each day, such as grapes, blueberries, tomatoes, broccoli, peppers, figs, olives, spinach, eggplant, beans, lentils, and chickpeas. Eat a variety of whole-grain foods each day, such as oats, brown rice, and whole wheat bread, pasta, and couscous. Eat fish at least 2 times a week. Try tuna, salmon, mackerel, lake trout, herring, or sardines. Eat moderate amounts of low-fat dairy products, such as milk, cheese, or yogurt. Eat moderate amounts of poultry and eggs. Choose healthy (unsaturated) fats, such as nuts, olive oil, and certain nut or seed oils like canola, soybean, and flaxseed. Limit unhealthy (saturated) fats, such as butter, palm oil, and coconut oil. And limit fats found in animal products, such as meat and dairy products made with whole milk. Try to eat red meat only a few times a month in very small amounts. Limit sweets and desserts to only a few times a week. This includes sugar-sweetened drinks like soda. The Mediterranean diet may also include red wine with your meal--1 glass each day for women and up to 2 glasses a day for men. Tips for eating at home  Use herbs, spices, garlic, lemon zest, and citrus juice instead of salt to add flavor to foods. Add avocado slices to your sandwich instead of witt. Have fish for lunch or dinner instead of red meat. Brush the fish with olive oil, and broil or grill it. Sprinkle your salad with seeds or nuts instead of cheese. Cook with olive or canola oil instead of butter or oils that are high in saturated fat. Switch from 2% milk or whole milk to 1% or fat-free milk. Dip raw vegetables in a vinaigrette dressing or hummus instead of dips made from mayonnaise or sour cream.  Have a piece of fruit for dessert instead of a piece of cake. Try baked apples, or have some dried fruit. Tips for eating out  Try broiled, grilled, baked, or poached fish instead of having it fried or breaded. Ask your  to have your meals prepared with olive oil instead of butter.   Order dishes made with marinara sauce or sauces made from olive oil. Avoid sauces made from cream or mayonnaise. Choose whole-grain breads, whole wheat pasta and pizza crust, brown rice, beans, and lentils. Cut back on butter or margarine on bread. Instead, you can dip your bread in a small amount of olive oil. Ask for a side salad or grilled vegetables instead of french fries or chips. Where can you learn more? Go to http://www.peacock.com/  Enter O407 in the search box to learn more about \"Learning About the Mediterranean Diet. \"  Current as of: September 8, 2021               Content Version: 13.2  © 2006-2022 [employer]. Care instructions adapted under license by [employer] (which disclaims liability or warranty for this information). If you have questions about a medical condition or this instruction, always ask your healthcare professional. [place] [address] any warranty or liability for your use of this information. Learning About Colonoscopy  What is a colonoscopy? A colonoscopy is a test (also called a procedure) that lets a doctor look inside your large intestine. The doctor uses a thin, lighted tube called a colonoscope. The doctor uses it to look for small growths called polyps, colon or rectal cancer (colorectal cancer), or other problems like bleeding. During the procedure, the doctor can take samples of tissue. The samples can then be checked for cancer or other conditions. The doctor can also take out polyps. How is a colonoscopy done? This procedure is done in a doctor's office or a clinic or hospital. You will get medicine to help you relax and not feel pain. Some people find that they don't remember having the test because of the medicine. The doctor gently moves the colonoscope, or scope, through the colon. The scope is also a small video camera. It lets the doctor see the colon and take pictures. How do you prepare for the procedure?   You need to clean out your colon before the procedure so the doctor can see your colon. This depends on which \"colon prep\" your doctor recommends. To clean out your colon, you'll do a \"colon prep\" before the test. This means you stop eating solid foods and drink only clear liquids. You can have water, tea, coffee, clear juices, clear broths, flavored ice pops, and gelatin (such as Jell-O). Do not drink anything red or purple. The day or night before the procedure, you drink a large amount of a special liquid. This causes loose, frequent stools. You will go to the bathroom a lot. Your doctor may have you drink part of the liquid the evening before and the rest on the day of the test. It's very important to drink all of the liquid. If you have problems drinking it, call your doctor. Arrange to have someone take you home after the test.  What can you expect after a colonoscopy? Your doctor will tell you when you can eat and do your usual activities. Drink a lot of fluid after the test to replace the fluids you may have lost during the colon prep. But don't drink alcohol. Your doctor will talk to you about when you'll need your next colonoscopy. The results of your test and your risk for colorectal cancer will help your doctor decide how often you need to be checked. After the test, you may be bloated or have gas pains. You may need to pass gas. If a biopsy was done or a polyp was removed, you may have streaks of blood in your stool (feces) for a few days. Check with your doctor to see when it is safe to take aspirin and nonsteroidal anti-inflammatory drugs (NSAIDs) again. Problems such as heavy rectal bleeding may not occur until several weeks after the test. This isn't common. But it can happen after polyps are removed. Follow-up care is a key part of your treatment and safety. Be sure to make and go to all appointments, and call your doctor if you are having problems.  It's also a good idea to know your test results and keep a list of the medicines you take. Where can you learn more? Go to http://www.gray.com/  Enter Z368 in the search box to learn more about \"Learning About Colonoscopy. \"  Current as of: September 8, 2021               Content Version: 13.2  © 3034-9465 [employer]. Care instructions adapted under license by [employer] (which disclaims liability or warranty for this information). If you have questions about a medical condition or this instruction, always ask your healthcare professional. [place] [address] any warranty or liability for your use of this information.

## 2022-09-07 NOTE — PROGRESS NOTES
Chief Complaint   Patient presents with    Physical     Visit Vitals  /84 (BP 1 Location: Left upper arm, BP Patient Position: Sitting, BP Cuff Size: Large adult)   Pulse 81   Resp 20   Ht 6' 1\" (1.854 m)   Wt 218 lb 8 oz (99.1 kg)   SpO2 98%   BMI 28.83 kg/m²       1. \"Have you been to the ER, urgent care clinic since your last visit? Hospitalized since your last visit? \" No    2. \"Have you seen or consulted any other health care providers outside of the [address] [place] [address] since your last visit? \" No     3. For patients aged 39-70: Has the patient had a colonoscopy / FIT/ Cologuard? No      If the patient is female:    4. For patients aged 41-77: Has the patient had a mammogram within the past 2 years? No      5. For patients aged 21-65: Has the patient had a pap smear?  No'

## 2022-09-08 LAB
ALBUMIN SERPL-MCNC: 4.4 G/DL (ref 3.5–5)
ALBUMIN/GLOB SERPL: 1.3 {RATIO} (ref 1.1–2.2)
ALP SERPL-CCNC: 62 U/L (ref 45–117)
ALT SERPL-CCNC: 34 U/L (ref 12–78)
ANION GAP SERPL CALC-SCNC: 5 MMOL/L (ref 5–15)
AST SERPL-CCNC: 18 U/L (ref 15–37)
BILIRUB SERPL-MCNC: 0.7 MG/DL (ref 0.2–1)
BUN SERPL-MCNC: 23 MG/DL (ref 6–20)
BUN/CREAT SERPL: 23 (ref 12–20)
CALCIUM SERPL-MCNC: 9.4 MG/DL (ref 8.5–10.1)
CHLORIDE SERPL-SCNC: 106 MMOL/L (ref 97–108)
CHOLEST SERPL-MCNC: 212 MG/DL
CO2 SERPL-SCNC: 28 MMOL/L (ref 21–32)
CREAT SERPL-MCNC: 1 MG/DL (ref 0.7–1.3)
GLOBULIN SER CALC-MCNC: 3.4 G/DL (ref 2–4)
GLUCOSE SERPL-MCNC: 105 MG/DL (ref 65–100)
HDLC SERPL-MCNC: 62 MG/DL
HDLC SERPL: 3.4 {RATIO} (ref 0–5)
LDLC SERPL CALC-MCNC: 130.2 MG/DL (ref 0–100)
POTASSIUM SERPL-SCNC: 4.2 MMOL/L (ref 3.5–5.1)
PROT SERPL-MCNC: 7.8 G/DL (ref 6.4–8.2)
SODIUM SERPL-SCNC: 139 MMOL/L (ref 136–145)
TRIGL SERPL-MCNC: 99 MG/DL (ref ?–150)
VLDLC SERPL CALC-MCNC: 19.8 MG/DL

## 2022-09-13 NOTE — PROGRESS NOTES
Please call the patient regarding his diagnostic evaluation. Persistent hypercholesterolemia. Mildly improved. However still with high risk so would advise starting atorvastatin as previously ordered.

## 2023-05-24 RX ORDER — DOXYCYCLINE HYCLATE 100 MG/1
100 CAPSULE ORAL 2 TIMES DAILY
COMMUNITY
Start: 2022-05-04

## 2023-05-24 RX ORDER — SILDENAFIL 100 MG/1
100 TABLET, FILM COATED ORAL PRN
COMMUNITY
Start: 2022-09-07

## 2023-05-24 RX ORDER — ATORVASTATIN CALCIUM 10 MG/1
10 TABLET, FILM COATED ORAL DAILY
Qty: 30 TABLET | Refills: 11 | COMMUNITY
Start: 2022-06-14 | End: 2023-06-09

## 2023-05-30 ENCOUNTER — TELEPHONE (OUTPATIENT)
Facility: CLINIC | Age: 63
End: 2023-05-30

## 2023-05-30 NOTE — TELEPHONE ENCOUNTER
----- Message from Alejandro Boss sent at 5/30/2023  1:02 PM EDT -----  Subject: Appointment Request    Reason for Call: New Patient/New to Provider Appointment needed: New   Patient Request Appointment    QUESTIONS    Reason for appointment request? No appointments available during search     Additional Information for Provider? pt use to see Dr Santo Kemp @   [facility] for many many years and was looking for a new pt kim with   [facility] please call with cancellations pt is not looking   for an kim one please leave detailed voice mail   ---------------------------------------------------------------------------  --------------  [address] [facility]  [phone]; OK to leave message on voicemail  ---------------------------------------------------------------------------  --------------  SCRIPT ANSWERS  COVID Screen: Kallie Rubio

## 2023-08-02 NOTE — TELEPHONE ENCOUNTER
PCP: Kip Doty MD    Last appt: 9/7/2022  Future Appointments   Date Time Provider [address]   12/14/2023  9:20 AM Rupal Grossman III [facility] BS AMB       Requested Prescriptions     Pending Prescriptions Disp Refills    sildenafil (VIAGRA) 100 MG tablet 30 tablet 0     Sig: Take 1 tablet by mouth as needed for Erectile Dysfunction       Prior labs and Blood pressures:  BP Readings from Last 3 Encounters:   09/07/22 138/84   05/24/22 138/82   05/04/22 122/72     Lab Results   Component Value Date/Time     09/07/2022 11:51 AM    K 4.2 09/07/2022 11:51 AM     09/07/2022 11:51 AM    CO2 28 09/07/2022 11:51 AM    BUN 23 09/07/2022 11:51 AM    GFRAA >60 09/07/2022 11:51 AM     No results found for: HBA1C, RNI0RZPO  Lab Results   Component Value Date/Time    CHOL 212 09/07/2022 11:51 AM    HDL 62 09/07/2022 11:51 AM    VLDL 34 02/01/2021 02:34 PM     No results found for: VITD3, VD3RIA        No results found for: TSH, TSH2, TSH3

## 2023-08-03 RX ORDER — SILDENAFIL 100 MG/1
100 TABLET, FILM COATED ORAL PRN
Qty: 30 TABLET | Refills: 0 | Status: SHIPPED | OUTPATIENT
Start: 2023-08-03

## 2023-12-12 SDOH — HEALTH STABILITY: PHYSICAL HEALTH: ON AVERAGE, HOW MANY MINUTES DO YOU ENGAGE IN EXERCISE AT THIS LEVEL?: 60 MIN

## 2023-12-12 SDOH — HEALTH STABILITY: PHYSICAL HEALTH: ON AVERAGE, HOW MANY DAYS PER WEEK DO YOU ENGAGE IN MODERATE TO STRENUOUS EXERCISE (LIKE A BRISK WALK)?: 2 DAYS

## 2023-12-14 ENCOUNTER — OFFICE VISIT (OUTPATIENT)
Age: 63
End: 2023-12-14
Payer: COMMERCIAL

## 2023-12-14 VITALS
HEART RATE: 62 BPM | HEIGHT: 73 IN | WEIGHT: 202.2 LBS | RESPIRATION RATE: 18 BRPM | TEMPERATURE: 98.1 F | OXYGEN SATURATION: 96 % | SYSTOLIC BLOOD PRESSURE: 121 MMHG | DIASTOLIC BLOOD PRESSURE: 74 MMHG | BODY MASS INDEX: 26.8 KG/M2

## 2023-12-14 DIAGNOSIS — E55.9 VITAMIN D DEFICIENCY: ICD-10-CM

## 2023-12-14 DIAGNOSIS — E78.2 MIXED HYPERLIPIDEMIA: ICD-10-CM

## 2023-12-14 DIAGNOSIS — N52.01 ERECTILE DYSFUNCTION DUE TO ARTERIAL INSUFFICIENCY: ICD-10-CM

## 2023-12-14 DIAGNOSIS — Z80.3 FAMILY HISTORY OF BREAST CANCER IN MALE: ICD-10-CM

## 2023-12-14 DIAGNOSIS — A63.0 ANAL MUCOSAL WART DUE TO HUMAN PAPILLOMAVIRUS (HPV): ICD-10-CM

## 2023-12-14 DIAGNOSIS — Z20.2 POSSIBLE EXPOSURE TO STD: ICD-10-CM

## 2023-12-14 DIAGNOSIS — R73.03 PREDIABETES: ICD-10-CM

## 2023-12-14 DIAGNOSIS — Z00.00 ANNUAL PHYSICAL EXAM: Primary | ICD-10-CM

## 2023-12-14 DIAGNOSIS — M17.31 POST-TRAUMATIC OSTEOARTHRITIS OF RIGHT KNEE: ICD-10-CM

## 2023-12-14 DIAGNOSIS — Z12.5 PROSTATE CANCER SCREENING: ICD-10-CM

## 2023-12-14 LAB
ALBUMIN SERPL-MCNC: 4.4 G/DL (ref 3.5–5)
ALBUMIN/GLOB SERPL: 1.2 (ref 1.1–2.2)
ALP SERPL-CCNC: 66 U/L (ref 45–117)
ALT SERPL-CCNC: 47 U/L (ref 12–78)
ANION GAP SERPL CALC-SCNC: 4 MMOL/L (ref 5–15)
AST SERPL-CCNC: 18 U/L (ref 15–37)
BASOPHILS # BLD: 0.1 K/UL (ref 0–0.1)
BASOPHILS NFR BLD: 1 % (ref 0–1)
BILIRUB SERPL-MCNC: 0.5 MG/DL (ref 0.2–1)
BUN SERPL-MCNC: 23 MG/DL (ref 6–20)
BUN/CREAT SERPL: 26 (ref 12–20)
CALCIUM SERPL-MCNC: 9.2 MG/DL (ref 8.5–10.1)
CHLORIDE SERPL-SCNC: 105 MMOL/L (ref 97–108)
CHOLEST SERPL-MCNC: 238 MG/DL
CO2 SERPL-SCNC: 29 MMOL/L (ref 21–32)
CREAT SERPL-MCNC: 0.89 MG/DL (ref 0.7–1.3)
CRP SERPL HS-MCNC: 0.7 MG/L
DIFFERENTIAL METHOD BLD: NORMAL
EOSINOPHIL # BLD: 0.3 K/UL (ref 0–0.4)
EOSINOPHIL NFR BLD: 6 % (ref 0–7)
ERYTHROCYTE [DISTWIDTH] IN BLOOD BY AUTOMATED COUNT: 12.2 % (ref 11.5–14.5)
EST. AVERAGE GLUCOSE BLD GHB EST-MCNC: 114 MG/DL
GLOBULIN SER CALC-MCNC: 3.8 G/DL (ref 2–4)
GLUCOSE SERPL-MCNC: 105 MG/DL (ref 65–100)
HBA1C MFR BLD: 5.6 % (ref 4–5.6)
HCT VFR BLD AUTO: 49.8 % (ref 36.6–50.3)
HDLC SERPL-MCNC: 51 MG/DL
HDLC SERPL: 4.7 (ref 0–5)
HGB BLD-MCNC: 16.6 G/DL (ref 12.1–17)
HIV 1+2 AB+HIV1 P24 AG SERPL QL IA: NONREACTIVE
HIV 1/2 RESULT COMMENT: NORMAL
IMM GRANULOCYTES # BLD AUTO: 0 K/UL (ref 0–0.04)
IMM GRANULOCYTES NFR BLD AUTO: 0 % (ref 0–0.5)
LDLC SERPL CALC-MCNC: 170.8 MG/DL (ref 0–100)
LYMPHOCYTES # BLD: 2.4 K/UL (ref 0.8–3.5)
LYMPHOCYTES NFR BLD: 40 % (ref 12–49)
MCH RBC QN AUTO: 30.5 PG (ref 26–34)
MCHC RBC AUTO-ENTMCNC: 33.3 G/DL (ref 30–36.5)
MCV RBC AUTO: 91.4 FL (ref 80–99)
MONOCYTES # BLD: 0.4 K/UL (ref 0–1)
MONOCYTES NFR BLD: 7 % (ref 5–13)
NEUTS SEG # BLD: 2.7 K/UL (ref 1.8–8)
NEUTS SEG NFR BLD: 46 % (ref 32–75)
NRBC # BLD: 0 K/UL (ref 0–0.01)
NRBC BLD-RTO: 0 PER 100 WBC
PLATELET # BLD AUTO: 271 K/UL (ref 150–400)
PMV BLD AUTO: 10.9 FL (ref 8.9–12.9)
POTASSIUM SERPL-SCNC: 4.4 MMOL/L (ref 3.5–5.1)
PROT SERPL-MCNC: 8.2 G/DL (ref 6.4–8.2)
PSA SERPL-MCNC: 1.1 NG/ML (ref 0.01–4)
RBC # BLD AUTO: 5.45 M/UL (ref 4.1–5.7)
SODIUM SERPL-SCNC: 138 MMOL/L (ref 136–145)
TRIGL SERPL-MCNC: 81 MG/DL
TSH SERPL DL<=0.05 MIU/L-ACNC: 2.15 UIU/ML (ref 0.36–3.74)
VLDLC SERPL CALC-MCNC: 16.2 MG/DL
WBC # BLD AUTO: 5.9 K/UL (ref 4.1–11.1)

## 2023-12-14 PROCEDURE — 99397 PER PM REEVAL EST PAT 65+ YR: CPT | Performed by: INTERNAL MEDICINE

## 2023-12-14 RX ORDER — SILDENAFIL 100 MG/1
100 TABLET, FILM COATED ORAL PRN
Qty: 30 TABLET | Refills: 3 | Status: SHIPPED | OUTPATIENT
Start: 2023-12-14

## 2023-12-14 RX ORDER — SILDENAFIL 100 MG/1
100 TABLET, FILM COATED ORAL PRN
Qty: 30 TABLET | Refills: 0 | Status: SHIPPED | OUTPATIENT
Start: 2023-12-14 | End: 2023-12-14

## 2023-12-14 ASSESSMENT — PATIENT HEALTH QUESTIONNAIRE - PHQ9
SUM OF ALL RESPONSES TO PHQ9 QUESTIONS 1 & 2: 0
1. LITTLE INTEREST OR PLEASURE IN DOING THINGS: 0
2. FEELING DOWN, DEPRESSED OR HOPELESS: 0
SUM OF ALL RESPONSES TO PHQ QUESTIONS 1-9: 0

## 2023-12-14 NOTE — PROGRESS NOTES
Avelino Collazo is a 70 y.o. male who presents for evaluation of npv, transfer of care, cpe. Used to go to [facility], last saw dr Rajput Meth 2022. He has adjusted his diet since then, keto diet. Weight is down 16 lbs. Bp is at goal.  He feels good for most part, though right knee bothers him at times. He would like to be tested for stds. 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 ROS:  Constitutional: negative for fevers, chills, anorexia and weight loss  Eyes:   negative for visual disturbance and irritation  ENT:   negative for tinnitus,sore throat,nasal congestion,ear pain,hoarseness  Respiratory:  negative for cough, hemoptysis, dyspnea,wheezing  CV:   negative for chest pain, palpitations, lower extremity edema  GI:   negative for nausea, vomiting, diarrhea, abdominal pain,melena  Genitourinary: negative for frequency, dysuria and hematuria  Musculoskel: negative for myalgias, arthralgias, back pain, muscle weakness.   ++right knee joint pain  Neurological:  negative for headaches, dizziness, focal weakness, numbness  Psychiatric:     Negative for depression or anxiety      Past Medical History:   Diagnosis Date    ED (erectile dysfunction) 08/27/2018    History of colonic polyps 08/27/2018    History of headache 08/03/2018    History of hearing loss 08/27/2018    right side    Hypercholesterolemia     Synovial cyst of lumbar spine     Vitamin D deficiency 08/27/2018       Past Surgical History:   Procedure Laterality Date    ORTHOPEDIC SURGERY         Family History   Problem Relation Age of Onset    Cancer Father         breast cancer    Emphysema Father        Social History     Socioeconomic History    Marital status:      Spouse name: Not on file    Number of children: Not on file    Years of education: Not on file    Highest education level: Not on file   Occupational History    Not on file   Tobacco Use    Smoking status: Never     Passive

## 2023-12-14 NOTE — PROGRESS NOTES
1. \"Have you been to the ER, urgent care clinic since your last visit? Hospitalized since your last visit? Lip laceration. ER. Unknown which one.    2. \"Have you seen or consulted any other health care providers outside of the [address] [place] [address] since your last visit?unknown    3. For patients aged 43-73: Has the patient had a colonoscopy / FIT/ Cologuard? Yes - no Care Gap present      If the patient is female:    4. For patients aged 43-66: Has the patient had a mammogram within the past 2 years? NA - based on age or sex      11. For patients aged 21-65: Has the patient had a pap smear?  NA - based on age or sex

## 2023-12-15 LAB
HSV1 IGG SER IA-ACNC: 15.9 INDEX (ref 0–0.9)
HSV2 IGG SER IA-ACNC: <0.91 INDEX (ref 0–0.9)

## 2023-12-22 ENCOUNTER — PATIENT MESSAGE (OUTPATIENT)
Age: 63
End: 2023-12-22

## 2023-12-28 RX ORDER — VALACYCLOVIR HYDROCHLORIDE 1 G/1
2000 TABLET, FILM COATED ORAL 2 TIMES DAILY
Qty: 4 TABLET | Refills: 0 | Status: SHIPPED | OUTPATIENT
Start: 2023-12-28

## 2023-12-28 NOTE — TELEPHONE ENCOUNTER
PCP: Thierno Eason DO    Last appt: 12/14/2023  Future Appointments   Date Time Provider [address]   12/17/2024  9:20 AM Cyndi Sánchez DO [facility] BS AMB       Requested Prescriptions     Pending Prescriptions Disp Refills    valACYclovir (VALTREX) 1 g tablet  0     Sig: Take 2 tablets by mouth 2 times daily

## 2024-02-16 DIAGNOSIS — Z20.5 EXPOSURE TO HEPATITIS B: Primary | ICD-10-CM

## 2024-02-17 LAB
HBV SURFACE AB SER QL: NONREACTIVE
HBV SURFACE AB SER-ACNC: <3.1 MIU/ML

## 2024-02-19 LAB — HBV E AB SERPL QL IA: NEGATIVE

## 2024-02-23 LAB
HBV CORE AB SERPL QL IA: NEGATIVE
HBV E AG SERPL QL IA: NEGATIVE

## 2024-05-13 ENCOUNTER — TELEPHONE (OUTPATIENT)
Age: 64
End: 2024-05-13

## 2024-05-13 DIAGNOSIS — N63.10 MASS OF RIGHT BREAST, UNSPECIFIED QUADRANT: Primary | ICD-10-CM

## 2024-05-13 NOTE — TELEPHONE ENCOUNTER
Placed call to patient to inform him that requested orders have been placed.  Patient verbalized understanding.   Patient provided with Central Scheduling phone number.

## 2024-05-13 NOTE — TELEPHONE ENCOUNTER
Pt states recently found lump in right breast    States Central Scheduling advised the order for mammogram will need to be updated    Please reorder as diagnostic and include ultrasound order.      Call pt to update: [phone]  Please advise the scheduling process too.

## 2024-06-06 ENCOUNTER — HOSPITAL ENCOUNTER (OUTPATIENT)
Facility: HOSPITAL | Age: 64
Discharge: HOME | End: 2024-06-06
Attending: INTERNAL MEDICINE
Payer: COMMERCIAL

## 2024-06-06 ENCOUNTER — HOSPITAL ENCOUNTER (OUTPATIENT)
Facility: HOSPITAL | Age: 64
End: 2024-06-06
Attending: INTERNAL MEDICINE
Payer: COMMERCIAL

## 2024-06-06 DIAGNOSIS — N63.10 MASS OF RIGHT BREAST, UNSPECIFIED QUADRANT: ICD-10-CM

## 2024-06-06 PROCEDURE — 77066 DX MAMMO INCL CAD BI: CPT

## 2024-06-06 PROCEDURE — 76642 ULTRASOUND BREAST LIMITED: CPT

## 2024-06-07 ENCOUNTER — TELEPHONE (OUTPATIENT)
Age: 64
End: 2024-06-07

## 2024-06-07 NOTE — TELEPHONE ENCOUNTER
Patient returned my call in regards to US results.   Patient informed of Dr. Tang's recommendation.     Per Dr. Tang:     Ultrasound shows small hematoma.   Expect it to re-absorb over time.  No further workup needed.     Patient verbalized understanding.

## 2024-12-17 ENCOUNTER — OFFICE VISIT (OUTPATIENT)
Age: 64
End: 2024-12-17
Payer: COMMERCIAL

## 2024-12-17 VITALS
HEART RATE: 63 BPM | SYSTOLIC BLOOD PRESSURE: 126 MMHG | HEIGHT: 73 IN | WEIGHT: 219.6 LBS | RESPIRATION RATE: 14 BRPM | DIASTOLIC BLOOD PRESSURE: 70 MMHG | BODY MASS INDEX: 29.1 KG/M2 | TEMPERATURE: 98.1 F | OXYGEN SATURATION: 98 %

## 2024-12-17 DIAGNOSIS — R73.03 PREDIABETES: ICD-10-CM

## 2024-12-17 DIAGNOSIS — A63.0 ANAL MUCOSAL WART DUE TO HUMAN PAPILLOMAVIRUS (HPV): ICD-10-CM

## 2024-12-17 DIAGNOSIS — Z20.2 POSSIBLE EXPOSURE TO STD: ICD-10-CM

## 2024-12-17 DIAGNOSIS — E78.2 MIXED HYPERLIPIDEMIA: ICD-10-CM

## 2024-12-17 DIAGNOSIS — N52.01 ERECTILE DYSFUNCTION DUE TO ARTERIAL INSUFFICIENCY: ICD-10-CM

## 2024-12-17 DIAGNOSIS — Z23 NEEDS FLU SHOT: ICD-10-CM

## 2024-12-17 DIAGNOSIS — Z12.5 PROSTATE CANCER SCREENING: ICD-10-CM

## 2024-12-17 DIAGNOSIS — Z00.00 ANNUAL PHYSICAL EXAM: Primary | ICD-10-CM

## 2024-12-17 PROCEDURE — 90653 IIV ADJUVANT VACCINE IM: CPT | Performed by: INTERNAL MEDICINE

## 2024-12-17 PROCEDURE — 99397 PER PM REEVAL EST PAT 65+ YR: CPT | Performed by: INTERNAL MEDICINE

## 2024-12-17 PROCEDURE — 90471 IMMUNIZATION ADMIN: CPT | Performed by: INTERNAL MEDICINE

## 2024-12-17 RX ORDER — SILDENAFIL 100 MG/1
100 TABLET, FILM COATED ORAL PRN
Qty: 30 TABLET | Refills: 8 | Status: SHIPPED | OUTPATIENT
Start: 2024-12-17

## 2024-12-17 SDOH — ECONOMIC STABILITY: INCOME INSECURITY: HOW HARD IS IT FOR YOU TO PAY FOR THE VERY BASICS LIKE FOOD, HOUSING, MEDICAL CARE, AND HEATING?: NOT HARD AT ALL

## 2024-12-17 SDOH — ECONOMIC STABILITY: FOOD INSECURITY: WITHIN THE PAST 12 MONTHS, YOU WORRIED THAT YOUR FOOD WOULD RUN OUT BEFORE YOU GOT MONEY TO BUY MORE.: NEVER TRUE

## 2024-12-17 SDOH — ECONOMIC STABILITY: FOOD INSECURITY: WITHIN THE PAST 12 MONTHS, THE FOOD YOU BOUGHT JUST DIDN'T LAST AND YOU DIDN'T HAVE MONEY TO GET MORE.: NEVER TRUE

## 2024-12-17 ASSESSMENT — PATIENT HEALTH QUESTIONNAIRE - PHQ9
1. LITTLE INTEREST OR PLEASURE IN DOING THINGS: NOT AT ALL
SUM OF ALL RESPONSES TO PHQ QUESTIONS 1-9: 0
SUM OF ALL RESPONSES TO PHQ QUESTIONS 1-9: 0
SUM OF ALL RESPONSES TO PHQ9 QUESTIONS 1 & 2: 0
SUM OF ALL RESPONSES TO PHQ QUESTIONS 1-9: 0
SUM OF ALL RESPONSES TO PHQ QUESTIONS 1-9: 0
2. FEELING DOWN, DEPRESSED OR HOPELESS: NOT AT ALL

## 2024-12-17 NOTE — PROGRESS NOTES
Hamzah Hughes is a 64 y.o. male who presents for evaluation of annual cpe.  Last seen by me dec 14, 2023 in npv.  Overall doing ok, though he recently had a relationship with his GF end, and that has him depressed some.   He is getting over it though and moving forward.  He would like to be tested again for stds, as he hopes to get back into dating scene again soon.      ROS:  Constitutional: negative for fevers, chills, anorexia and weight loss  Eyes:   negative for visual disturbance and irritation  ENT:   negative for tinnitus,sore throat,nasal congestion,ear pain,hoarseness  Respiratory:  negative for cough, hemoptysis, dyspnea,wheezing  CV:   negative for chest pain, palpitations, lower extremity edema  GI:   negative for nausea, vomiting, diarrhea, abdominal pain,melena  Genitourinary: negative for frequency, dysuria and hematuria  Musculoskel: negative for myalgias, arthralgias, back pain, muscle weakness, joint pain  Neurological:  negative for headaches, dizziness, focal weakness, numbness  Psychiatric:     Negative for depression or anxiety      Past Medical History:   Diagnosis Date    ED (erectile dysfunction) 08/27/2018    History of colonic polyps 08/27/2018    History of headache 08/03/2018    History of hearing loss 08/27/2018    right side    Hypercholesterolemia     Synovial cyst of lumbar spine     Vitamin D deficiency 08/27/2018       Past Surgical History:   Procedure Laterality Date    ORTHOPEDIC SURGERY         Family History   Problem Relation Age of Onset    Breast Cancer Father 75    Cancer Father         breast cancer    Emphysema Father     Breast Cancer Maternal Aunt        Social History     Socioeconomic History    Marital status:      Spouse name: Not on file    Number of children: Not on file    Years of education: Not on file    Highest education level: Not on file   Occupational History    Not on file   Tobacco Use    Smoking status: Never     Passive exposure: Never

## 2024-12-18 LAB
ALBUMIN SERPL-MCNC: 4.2 G/DL (ref 3.5–5)
ALBUMIN/GLOB SERPL: 1.2 (ref 1.1–2.2)
ALP SERPL-CCNC: 72 U/L (ref 45–117)
ALT SERPL-CCNC: 39 U/L (ref 12–78)
ANION GAP SERPL CALC-SCNC: 6 MMOL/L (ref 2–12)
AST SERPL-CCNC: 18 U/L (ref 15–37)
BASOPHILS # BLD: 0.1 K/UL (ref 0–0.1)
BASOPHILS NFR BLD: 1 % (ref 0–1)
BILIRUB SERPL-MCNC: 0.7 MG/DL (ref 0.2–1)
BUN SERPL-MCNC: 21 MG/DL (ref 6–20)
BUN/CREAT SERPL: 21 (ref 12–20)
CALCIUM SERPL-MCNC: 9.5 MG/DL (ref 8.5–10.1)
CHLORIDE SERPL-SCNC: 106 MMOL/L (ref 97–108)
CHOLEST SERPL-MCNC: 222 MG/DL
CO2 SERPL-SCNC: 27 MMOL/L (ref 21–32)
CREAT SERPL-MCNC: 0.99 MG/DL (ref 0.7–1.3)
CRP SERPL HS-MCNC: 1.3 MG/L
DIFFERENTIAL METHOD BLD: NORMAL
EOSINOPHIL # BLD: 0.3 K/UL (ref 0–0.4)
EOSINOPHIL NFR BLD: 5 % (ref 0–7)
ERYTHROCYTE [DISTWIDTH] IN BLOOD BY AUTOMATED COUNT: 12.6 % (ref 11.5–14.5)
EST. AVERAGE GLUCOSE BLD GHB EST-MCNC: 117 MG/DL
GLOBULIN SER CALC-MCNC: 3.6 G/DL (ref 2–4)
GLUCOSE SERPL-MCNC: 109 MG/DL (ref 65–100)
HBA1C MFR BLD: 5.7 % (ref 4–5.6)
HBV SURFACE AB SER QL: NONREACTIVE
HBV SURFACE AB SER-ACNC: <3.1 MIU/ML
HCT VFR BLD AUTO: 50 % (ref 36.6–50.3)
HCV AB SER IA-ACNC: 0.15 INDEX
HCV AB SERPL QL IA: NONREACTIVE
HDLC SERPL-MCNC: 54 MG/DL
HDLC SERPL: 4.1 (ref 0–5)
HGB BLD-MCNC: 16.5 G/DL (ref 12.1–17)
HIV 1+2 AB+HIV1 P24 AG SERPL QL IA: NONREACTIVE
HIV 1/2 RESULT COMMENT: NORMAL
IMM GRANULOCYTES # BLD AUTO: 0 K/UL (ref 0–0.04)
IMM GRANULOCYTES NFR BLD AUTO: 0 % (ref 0–0.5)
LDLC SERPL CALC-MCNC: 147.6 MG/DL (ref 0–100)
LYMPHOCYTES # BLD: 2 K/UL (ref 0.8–3.5)
LYMPHOCYTES NFR BLD: 35 % (ref 12–49)
MCH RBC QN AUTO: 29.8 PG (ref 26–34)
MCHC RBC AUTO-ENTMCNC: 33 G/DL (ref 30–36.5)
MCV RBC AUTO: 90.4 FL (ref 80–99)
MONOCYTES # BLD: 0.4 K/UL (ref 0–1)
MONOCYTES NFR BLD: 7 % (ref 5–13)
NEUTS SEG # BLD: 3 K/UL (ref 1.8–8)
NEUTS SEG NFR BLD: 52 % (ref 32–75)
NRBC # BLD: 0 K/UL (ref 0–0.01)
NRBC BLD-RTO: 0 PER 100 WBC
PLATELET # BLD AUTO: 263 K/UL (ref 150–400)
PMV BLD AUTO: 11.6 FL (ref 8.9–12.9)
POTASSIUM SERPL-SCNC: 4.6 MMOL/L (ref 3.5–5.1)
PROT SERPL-MCNC: 7.8 G/DL (ref 6.4–8.2)
PSA SERPL-MCNC: 0.8 NG/ML (ref 0.01–4)
RBC # BLD AUTO: 5.53 M/UL (ref 4.1–5.7)
SODIUM SERPL-SCNC: 139 MMOL/L (ref 136–145)
T PALLIDUM AB SER QL IA: NON REACTIVE
TRIGL SERPL-MCNC: 102 MG/DL
TSH SERPL DL<=0.05 MIU/L-ACNC: 2.07 UIU/ML (ref 0.36–3.74)
VLDLC SERPL CALC-MCNC: 20.4 MG/DL
WBC # BLD AUTO: 5.7 K/UL (ref 4.1–11.1)

## 2024-12-19 LAB
HSV1 IGG SER IA-ACNC: REACTIVE
HSV2 IGG SER IA-ACNC: NON REACTIVE

## 2024-12-20 LAB
C TRACH RRNA SPEC QL NAA+PROBE: NEGATIVE
N GONORRHOEA RRNA SPEC QL NAA+PROBE: NEGATIVE
SPECIMEN SOURCE: NORMAL
T VAGINALIS RRNA SPEC QL NAA+PROBE: NEGATIVE